# Patient Record
Sex: MALE | Race: WHITE | Employment: FULL TIME | URBAN - METROPOLITAN AREA
[De-identification: names, ages, dates, MRNs, and addresses within clinical notes are randomized per-mention and may not be internally consistent; named-entity substitution may affect disease eponyms.]

---

## 2017-05-23 ENCOUNTER — OFFICE VISIT (OUTPATIENT)
Dept: URGENT CARE | Age: 37
End: 2017-05-23

## 2017-05-23 VITALS
HEIGHT: 72 IN | HEART RATE: 68 BPM | DIASTOLIC BLOOD PRESSURE: 60 MMHG | WEIGHT: 195 LBS | SYSTOLIC BLOOD PRESSURE: 100 MMHG | RESPIRATION RATE: 16 BRPM | BODY MASS INDEX: 26.41 KG/M2 | TEMPERATURE: 97.5 F

## 2017-05-23 DIAGNOSIS — R19.7 VOMITING AND DIARRHEA: Primary | ICD-10-CM

## 2017-05-23 DIAGNOSIS — R11.10 VOMITING AND DIARRHEA: Primary | ICD-10-CM

## 2017-05-23 PROCEDURE — 99203 OFFICE O/P NEW LOW 30 MIN: CPT | Performed by: EMERGENCY MEDICINE

## 2017-05-23 RX ORDER — PROMETHAZINE HYDROCHLORIDE 25 MG/1
25 TABLET ORAL EVERY 6 HOURS PRN
Qty: 12 TABLET | Refills: 0 | Status: SHIPPED | OUTPATIENT
Start: 2017-05-23 | End: 2019-01-29 | Stop reason: ALTCHOICE

## 2017-05-23 RX ORDER — LOPERAMIDE HYDROCHLORIDE 2 MG/1
2 CAPSULE ORAL 4 TIMES DAILY PRN
Qty: 12 CAPSULE | Refills: 0 | Status: SHIPPED | OUTPATIENT
Start: 2017-05-23 | End: 2019-01-29 | Stop reason: ALTCHOICE

## 2017-05-23 ASSESSMENT — ENCOUNTER SYMPTOMS
EYES NEGATIVE: 1
ABDOMINAL PAIN: 1
NAUSEA: 1
SHORTNESS OF BREATH: 0
BLOOD IN STOOL: 0
COUGH: 0
DIARRHEA: 1
VOMITING: 1

## 2018-02-25 PROBLEM — K35.80 ACUTE APPENDICITIS: Status: ACTIVE | Noted: 2018-02-25

## 2018-03-13 ENCOUNTER — OFFICE VISIT (OUTPATIENT)
Dept: SURGERY | Age: 38
End: 2018-03-13

## 2018-03-13 VITALS
SYSTOLIC BLOOD PRESSURE: 120 MMHG | HEIGHT: 72 IN | DIASTOLIC BLOOD PRESSURE: 72 MMHG | BODY MASS INDEX: 28.85 KG/M2 | WEIGHT: 213 LBS

## 2018-03-13 DIAGNOSIS — Z98.890 POST-OPERATIVE STATE: Primary | ICD-10-CM

## 2018-03-13 PROCEDURE — 99024 POSTOP FOLLOW-UP VISIT: CPT | Performed by: SURGERY

## 2019-01-29 ENCOUNTER — APPOINTMENT (OUTPATIENT)
Dept: GENERAL RADIOLOGY | Age: 39
End: 2019-01-29
Payer: COMMERCIAL

## 2019-01-29 ENCOUNTER — HOSPITAL ENCOUNTER (EMERGENCY)
Age: 39
Discharge: HOME | End: 2019-01-29
Payer: COMMERCIAL

## 2019-01-29 VITALS
HEART RATE: 82 BPM | DIASTOLIC BLOOD PRESSURE: 93 MMHG | OXYGEN SATURATION: 96 % | TEMPERATURE: 98.4 F | SYSTOLIC BLOOD PRESSURE: 125 MMHG | RESPIRATION RATE: 16 BRPM

## 2019-01-29 DIAGNOSIS — M25.512 ACUTE PAIN OF LEFT SHOULDER: ICD-10-CM

## 2019-01-29 DIAGNOSIS — S09.90XA INJURY OF HEAD, INITIAL ENCOUNTER: Primary | ICD-10-CM
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
## 2019-01-29 PROCEDURE — 73030 X-RAY EXAM OF SHOULDER: CPT

## 2019-01-29 PROCEDURE — 72100 X-RAY EXAM L-S SPINE 2/3 VWS: CPT

## 2019-01-29 PROCEDURE — 6370000000 HC RX 637 (ALT 250 FOR IP): Performed by: NURSE PRACTITIONER

## 2019-01-29 PROCEDURE — 99283 EMERGENCY DEPT VISIT LOW MDM: CPT

## 2019-01-29 RX ORDER — NAPROXEN 250 MG/1
250 TABLET ORAL 2 TIMES DAILY WITH MEALS
Qty: 60 TABLET | Refills: 0 | Status: SHIPPED | OUTPATIENT
Start: 2019-01-29 | End: 2020-01-24

## 2019-01-29 RX ORDER — HYDROCODONE BITARTRATE AND ACETAMINOPHEN 5; 325 MG/1; MG/1
1 TABLET ORAL ONCE
Status: COMPLETED | OUTPATIENT
Start: 2019-01-29 | End: 2019-01-29

## 2019-01-29 RX ADMIN — HYDROCODONE BITARTRATE AND ACETAMINOPHEN 1 TABLET: 5; 325 TABLET ORAL at 14:01

## 2019-01-29 ASSESSMENT — PAIN SCALES - GENERAL
PAINLEVEL_OUTOF10: 8
PAINLEVEL_OUTOF10: 8
PAINLEVEL_OUTOF10: 7

## 2019-01-29 ASSESSMENT — PAIN DESCRIPTION - LOCATION
LOCATION: BACK;HEAD
LOCATION: BACK;HEAD

## 2019-01-29 ASSESSMENT — ENCOUNTER SYMPTOMS
COLOR CHANGE: 0
BACK PAIN: 1

## 2020-01-24 ENCOUNTER — HOSPITAL ENCOUNTER (EMERGENCY)
Age: 40
Discharge: HOME | End: 2020-01-24
Payer: COMMERCIAL

## 2020-01-24 VITALS
SYSTOLIC BLOOD PRESSURE: 128 MMHG | HEIGHT: 72 IN | HEART RATE: 103 BPM | TEMPERATURE: 98 F | DIASTOLIC BLOOD PRESSURE: 83 MMHG | BODY MASS INDEX: 29.12 KG/M2 | WEIGHT: 215 LBS | RESPIRATION RATE: 20 BRPM | OXYGEN SATURATION: 97 %

## 2020-01-24 LAB
RAPID INFLUENZA  B AGN: NEGATIVE
RAPID INFLUENZA A AGN: NEGATIVE
S PYO AG THROAT QL: NEGATIVE

## 2020-01-24 PROCEDURE — 87081 CULTURE SCREEN ONLY: CPT

## 2020-01-24 PROCEDURE — 87804 INFLUENZA ASSAY W/OPTIC: CPT

## 2020-01-24 PROCEDURE — 99282 EMERGENCY DEPT VISIT SF MDM: CPT

## 2020-01-24 PROCEDURE — 87880 STREP A ASSAY W/OPTIC: CPT

## 2020-01-24 PROCEDURE — 6370000000 HC RX 637 (ALT 250 FOR IP): Performed by: PHYSICIAN ASSISTANT

## 2020-01-24 RX ORDER — DOXYCYCLINE HYCLATE 100 MG
100 TABLET ORAL ONCE
Status: COMPLETED | OUTPATIENT
Start: 2020-01-24 | End: 2020-01-24

## 2020-01-24 RX ORDER — PREDNISONE 20 MG/1
20 TABLET ORAL ONCE
Status: COMPLETED | OUTPATIENT
Start: 2020-01-24 | End: 2020-01-24

## 2020-01-24 RX ORDER — DOXYCYCLINE HYCLATE 100 MG
100 TABLET ORAL 2 TIMES DAILY
Qty: 20 TABLET | Refills: 0 | Status: SHIPPED | OUTPATIENT
Start: 2020-01-24 | End: 2020-02-03

## 2020-01-24 RX ORDER — PREDNISONE 10 MG/1
20 TABLET ORAL DAILY
Qty: 10 TABLET | Refills: 0 | Status: SHIPPED | OUTPATIENT
Start: 2020-01-24 | End: 2020-01-29

## 2020-01-24 RX ORDER — LEVOCETIRIZINE DIHYDROCHLORIDE 5 MG/1
5 TABLET, FILM COATED ORAL NIGHTLY
Qty: 14 TABLET | Refills: 0 | Status: SHIPPED | OUTPATIENT
Start: 2020-01-24

## 2020-01-24 RX ORDER — SULFAMETHOXAZOLE AND TRIMETHOPRIM 800; 160 MG/1; MG/1
TABLET ORAL
COMMUNITY
Start: 2020-01-22 | End: 2020-01-24 | Stop reason: SINTOL

## 2020-01-24 RX ADMIN — PREDNISONE 20 MG: 20 TABLET ORAL at 23:37

## 2020-01-24 RX ADMIN — DOXYCYCLINE HYCLATE 100 MG: 100 TABLET, COATED ORAL at 23:37

## 2020-01-24 ASSESSMENT — PAIN SCALES - GENERAL: PAINLEVEL_OUTOF10: 8

## 2020-01-25 NOTE — ED PROVIDER NOTES
Constitutional:       Appearance: Normal appearance. He is well-developed and normal weight. HENT:      Head: Normocephalic and atraumatic. Right Ear: Tympanic membrane, ear canal and external ear normal.      Left Ear: Tympanic membrane, ear canal and external ear normal.      Nose: Congestion present. Mouth/Throat:      Mouth: Mucous membranes are moist.      Pharynx: Oropharynx is clear. Eyes:      General: No scleral icterus. Right eye: No discharge. Left eye: No discharge. Conjunctiva/sclera: Conjunctivae normal.   Neck:      Musculoskeletal: Normal range of motion and neck supple. No muscular tenderness. Cardiovascular:      Rate and Rhythm: Normal rate and regular rhythm. Heart sounds: Normal heart sounds. Pulmonary:      Effort: Pulmonary effort is normal.   Musculoskeletal: Normal range of motion. Skin:     General: Skin is warm and dry. Neurological:      General: No focal deficit present. Mental Status: He is alert and oriented to person, place, and time. Mental status is at baseline. Psychiatric:         Mood and Affect: Mood normal.         Behavior: Behavior normal.         Thought Content:  Thought content normal.         Judgment: Judgment normal.         MEDICAL DECISION MAKING    Vitals:    Vitals:    01/24/20 2232   BP: 128/83   Pulse: 103   Resp: 20   Temp: 98 °F (36.7 °C)   TempSrc: Oral   SpO2: 97%   Weight: 215 lb (97.5 kg)   Height: 6' (1.829 m)       LABS:  Labs Reviewed   RAPID INFLUENZA A/B ANTIGENS    Narrative:     Performed at:  [place] [address] [facility]   Phone [phone] A THROAT    Narrative:     Performed at:  [place] [address] [facility]   Phone [phone]   CULTURE BETA STREP CONFIRM PLATE    Narrative:     ORDER#: [id number]                          ORDERED BY: DANIEL HAY  SOURCE: Throat                             COLLECTED:  01/24/20 22:36  ANTIBIOTICS AT SHARLA.:                      RECEIVED :  01/25/20 09:53  Performed at:  [place] [address] [address] [facility]   Phone ([phone] of labs reviewed and werenegative at this time or not returned at the time of this note. RADIOLOGY:   Non-plain film images such as CT, Ultrasound and MRI are read by the radiologist. Rajat Dacosta PA-C have directly visualized the radiologic plain film image(s) with the below findings:    Not obtained    Interpretation per the Radiologist below, if available at the time of this note:    No orders to display        No results found. MEDICAL DECISION MAKING / ED COURSE:      PROCEDURES:   Procedures    None    Patient was given:  Medications   doxycycline hyclate (VIBRA-TABS) tablet 100 mg (100 mg Oral Given 1/24/20 2337)   predniSONE (DELTASONE) tablet 20 mg (20 mg Oral Given 1/24/20 2337)       I did consult with her pharmacist and they did indicate the congestion could be related to the sulfa product. Patient with nasal congestion rather moderate to marked as well as facial flushing. This will be discontinued. I will start patient on prednisone to help relieve current symptoms also recommending Afrin nasal spray via the pump unit. Crutches prednisone given. Continuation prednisone will take place over the next 4 to 5 days. The patient does express understanding of his diagnosis and treatment plan. I did opt to change patient from Bactrim to the doxycycline for the cyst as he has had exercycle in in the past without adverse event. The patient tolerated their visit well. I evaluated the patient. The physician was available for consultation as needed. The patient and / or the family were informed of the results of any tests, a time was given to answer questions, a plan was proposed and they agreed with plan. CLINICAL IMPRESSION:  1.  Adverse effect of sulfacetamide, initial encounter        DISPOSITION Decision To Discharge 01/24/2020 11:34:54 PM      PATIENT REFERRED TO:  Your healthcare provider    Schedule an appointment as soon as possible for a visit       [facility]  ED  [address]  Go to   If symptoms worsen      DISCHARGE MEDICATIONS:  Discharge Medication List as of 1/24/2020 11:39 PM      START taking these medications    Details   predniSONE (DELTASONE) 10 MG tablet Take 2 tablets by mouth daily for 5 doses, Disp-10 tablet, R-0Print      levocetirizine (XYZAL) 5 MG tablet Take 1 tablet by mouth nightly, Disp-14 tablet, R-0Print      doxycycline hyclate (VIBRA-TABS) 100 MG tablet Take 1 tablet by mouth 2 times daily for 10 days, Disp-20 tablet, R-0Print             DISCONTINUED MEDICATIONS:  Discharge Medication List as of 1/24/2020 11:39 PM      STOP taking these medications       naproxen (NAPROSYN) 250 MG tablet Comments:   Reason for Stopping:         dicyclomine (BENTYL) 10 MG capsule Comments:   Reason for Stopping:                      (Please note the MDM and HPI sections of this note were completed with a voice recognition program.  Efforts were made to edit the dictations but occasionally words are mis-transcribed.)    Electronically signed, Patrica Cintron PA-C,          Patrica Cintron PA-C  01/25/20 9376

## 2020-01-26 LAB — S PYO THROAT QL CULT: NORMAL

## 2020-09-09 ENCOUNTER — TELEPHONE (OUTPATIENT)
Dept: FAMILY MEDICINE CLINIC | Age: 40
End: 2020-09-09

## 2020-09-09 NOTE — TELEPHONE ENCOUNTER
It is my understanding that the pt has to be established with a provider before we can see them for an acute visit. You can double check with Asiya Kyle. If I am allowed to see him before he establishes with Judit, I would be happy to do so if I have an open OV. Thank you.

## 2020-09-09 NOTE — TELEPHONE ENCOUNTER
Patient is scheduled to see you for a new patient appt but not until 10/12. Hes been having a lot of dizziness/ear problems and wants to know if there is any possible way he can be seen sooner? No OTC has seemed to be helping. Could use same days or provider fill if available? No other issues.  Please Advise

## 2020-09-10 NOTE — TELEPHONE ENCOUNTER
Spoke to leny she said to speak to Dr. Troy Dupont to see if he would see patient. Spoke to him and he said that would be fine.  Patient will be seeing Dr. Troy Dupont as anew patient next week

## 2020-09-10 NOTE — TELEPHONE ENCOUNTER
Pt is scheduled as below for NP appt.   Future Appointments   Date Time Provider Helena Haile   9/14/2020  2:00 PM DO MARIELOS Canas

## 2020-09-14 ENCOUNTER — OFFICE VISIT (OUTPATIENT)
Dept: FAMILY MEDICINE CLINIC | Age: 40
End: 2020-09-14
Payer: COMMERCIAL

## 2020-09-14 VITALS
BODY MASS INDEX: 29.39 KG/M2 | RESPIRATION RATE: 16 BRPM | HEART RATE: 90 BPM | TEMPERATURE: 98.1 F | DIASTOLIC BLOOD PRESSURE: 76 MMHG | OXYGEN SATURATION: 99 % | HEIGHT: 72 IN | SYSTOLIC BLOOD PRESSURE: 140 MMHG | WEIGHT: 217 LBS

## 2020-09-14 DIAGNOSIS — Z11.4 ENCOUNTER FOR SCREENING FOR HIV: ICD-10-CM

## 2020-09-14 PROCEDURE — 90472 IMMUNIZATION ADMIN EACH ADD: CPT | Performed by: STUDENT IN AN ORGANIZED HEALTH CARE EDUCATION/TRAINING PROGRAM

## 2020-09-14 PROCEDURE — 90686 IIV4 VACC NO PRSV 0.5 ML IM: CPT | Performed by: STUDENT IN AN ORGANIZED HEALTH CARE EDUCATION/TRAINING PROGRAM

## 2020-09-14 PROCEDURE — 90471 IMMUNIZATION ADMIN: CPT | Performed by: STUDENT IN AN ORGANIZED HEALTH CARE EDUCATION/TRAINING PROGRAM

## 2020-09-14 PROCEDURE — 99214 OFFICE O/P EST MOD 30 MIN: CPT | Performed by: STUDENT IN AN ORGANIZED HEALTH CARE EDUCATION/TRAINING PROGRAM

## 2020-09-14 PROCEDURE — 90715 TDAP VACCINE 7 YRS/> IM: CPT | Performed by: STUDENT IN AN ORGANIZED HEALTH CARE EDUCATION/TRAINING PROGRAM

## 2020-09-14 RX ORDER — MECLIZINE HYDROCHLORIDE 25 MG/1
25 TABLET ORAL 3 TIMES DAILY PRN
Qty: 30 TABLET | Refills: 0 | Status: SHIPPED | OUTPATIENT
Start: 2020-09-14 | End: 2020-09-24

## 2020-09-14 ASSESSMENT — PATIENT HEALTH QUESTIONNAIRE - PHQ9
7. TROUBLE CONCENTRATING ON THINGS, SUCH AS READING THE NEWSPAPER OR WATCHING TELEVISION: 0
SUM OF ALL RESPONSES TO PHQ QUESTIONS 1-9: 10
8. MOVING OR SPEAKING SO SLOWLY THAT OTHER PEOPLE COULD HAVE NOTICED. OR THE OPPOSITE, BEING SO FIGETY OR RESTLESS THAT YOU HAVE BEEN MOVING AROUND A LOT MORE THAN USUAL: 0
6. FEELING BAD ABOUT YOURSELF - OR THAT YOU ARE A FAILURE OR HAVE LET YOURSELF OR YOUR FAMILY DOWN: 1
9. THOUGHTS THAT YOU WOULD BE BETTER OFF DEAD, OR OF HURTING YOURSELF: 0
4. FEELING TIRED OR HAVING LITTLE ENERGY: 3
SUM OF ALL RESPONSES TO PHQ9 QUESTIONS 1 & 2: 2
2. FEELING DOWN, DEPRESSED OR HOPELESS: 2
SUM OF ALL RESPONSES TO PHQ QUESTIONS 1-9: 2
3. TROUBLE FALLING OR STAYING ASLEEP: 3
SUM OF ALL RESPONSES TO PHQ QUESTIONS 1-9: 10
1. LITTLE INTEREST OR PLEASURE IN DOING THINGS: 1
2. FEELING DOWN, DEPRESSED OR HOPELESS: 1
SUM OF ALL RESPONSES TO PHQ QUESTIONS 1-9: 2
5. POOR APPETITE OR OVEREATING: 0
SUM OF ALL RESPONSES TO PHQ9 QUESTIONS 1 & 2: 3
1. LITTLE INTEREST OR PLEASURE IN DOING THINGS: 1

## 2020-09-14 ASSESSMENT — ENCOUNTER SYMPTOMS
DIARRHEA: 0
SHORTNESS OF BREATH: 0
CONSTIPATION: 0

## 2020-09-14 ASSESSMENT — ANXIETY QUESTIONNAIRES
2. NOT BEING ABLE TO STOP OR CONTROL WORRYING: 3-NEARLY EVERY DAY
GAD7 TOTAL SCORE: 17
1. FEELING NERVOUS, ANXIOUS, OR ON EDGE: 3-NEARLY EVERY DAY
6. BECOMING EASILY ANNOYED OR IRRITABLE: 3-NEARLY EVERY DAY
4. TROUBLE RELAXING: 2-OVER HALF THE DAYS
3. WORRYING TOO MUCH ABOUT DIFFERENT THINGS: 3-NEARLY EVERY DAY
7. FEELING AFRAID AS IF SOMETHING AWFUL MIGHT HAPPEN: 1-SEVERAL DAYS
5. BEING SO RESTLESS THAT IT IS HARD TO SIT STILL: 2-OVER HALF THE DAYS

## 2020-09-14 ASSESSMENT — COLUMBIA-SUICIDE SEVERITY RATING SCALE - C-SSRS
2. HAVE YOU ACTUALLY HAD ANY THOUGHTS OF KILLING YOURSELF?: NO
1. WITHIN THE PAST MONTH, HAVE YOU WISHED YOU WERE DEAD OR WISHED YOU COULD GO TO SLEEP AND NOT WAKE UP?: NO
6. HAVE YOU EVER DONE ANYTHING, STARTED TO DO ANYTHING, OR PREPARED TO DO ANYTHING TO END YOUR LIFE?: NO

## 2020-09-14 NOTE — LETTER
[address]  [facility] [address]  Phone: [phone]  Fax: [phone]    Justine Way DO        September 14, 2020     Patient: Alyse Yoon   YOB: 1980   Date of Visit: 9/14/2020       To Whom It May Concern: It is my medical opinion that Usman Adams should avoid all rapid changes in position including lifting, bending, rotating until further evaluation. If you have any questions or concerns, please don't hesitate to call.     Sincerely,          Justine Way DO

## 2020-09-14 NOTE — PATIENT INSTRUCTIONS
loss of movement in your face, arm, or leg, especially on only one side of your body. ? Sudden vision changes. ? Sudden trouble speaking. ? Sudden confusion or trouble understanding simple statements. ? Sudden problems with walking or balance. ? A sudden, severe headache that is different from past headaches. Call your doctor now or seek immediate medical care if:  · You have new or worse nausea and vomiting. · You have new symptoms such as hearing loss or roaring in your ears. Watch closely for changes in your health, and be sure to contact your doctor if:  · You are not getting better as expected. · Your vertigo gets worse. Where can you learn more? Go to [email or web address] and sign in to your [employer] account. Enter  in the [employer]apier box to learn more about \"Benign Paroxysmal Positional Vertigo (BPPV): Care Instructions. \"     If you do not have an account, please click on the \"Sign Up Now\" link. Current as of: July 29, 2019               Content Version: 12.5  © 6888-7526 [employer]. Care instructions adapted under license by [facility] ([facility]). If you have questions about a medical condition or this instruction, always ask your healthcare professional. [place] [address] any warranty or liability for your use of this information. Patient Education        Epley Maneuver for Vertigo: Exercises  Your Care Instructions  The Epley Maneuver is a series of movements your doctor may use to treat your vertigo. Here are the steps for the exercises. Your doctor or physical therapist will guide you through the movements. A single 10- to 15-minute session often is all that's needed. Crystal debris (canaliths) cause the vertigo. When your head is moved into different positions, the debris moves freely. This may cause your symptoms to stop. How to do the exercises  Step 1   1. You will sit on the doctor's exam table.  Your legs will be out

## 2020-09-14 NOTE — PROGRESS NOTES
Height: 6' (1.829 m)     Estimated body mass index is 29.43 kg/m² as calculated from the following:    Height as of this encounter: 6' (1.829 m). Weight as of this encounter: 217 lb (98.4 kg). Physical Exam  Vitals signs reviewed. Constitutional:       General: He is not in acute distress. Appearance: Normal appearance. He is not ill-appearing. HENT:      Head: Normocephalic and atraumatic. Right Ear: Tympanic membrane normal.      Left Ear: Tympanic membrane normal.   Eyes:      Extraocular Movements: Extraocular movements intact. Conjunctiva/sclera: Conjunctivae normal.   Cardiovascular:      Rate and Rhythm: Normal rate and regular rhythm. Pulses: Normal pulses. Heart sounds: Normal heart sounds. No murmur. Pulmonary:      Effort: Pulmonary effort is normal.      Breath sounds: Normal breath sounds. Abdominal:      General: Abdomen is flat. Bowel sounds are normal. There is no distension. Palpations: Abdomen is soft. Tenderness: There is no abdominal tenderness. Musculoskeletal: Normal range of motion. General: No swelling. Right lower leg: No edema. Left lower leg: No edema. Skin:     General: Skin is warm and dry. Capillary Refill: Capillary refill takes less than 2 seconds. Findings: No rash. Neurological:      General: No focal deficit present. Mental Status: He is alert and oriented to person, place, and time. Comments: +HINTS  [place] halpike without nystagmus but with onset of dizziness   Psychiatric:         Mood and Affect: Mood normal.         Behavior: Behavior normal.         Thought Content: Thought content normal.         Judgment: Judgment normal.       AL 7 SCORE 9/14/2020   AL-7 Total Score 17     Interpretation of AL-7 score: 5-9 = mild anxiety, 10-14 = moderate anxiety, 15+ = severe anxiety. Recommend referral to behavioral health for scores 10 or greater.   PHQ-9 Total Score: 10 (9/14/2020  2:22 PM)  Thoughts that you would be better off dead, or of hurting yourself in some way: 0 (9/14/2020  2:22 PM)        ASSESSMENT/PLAN:  1. Benign paroxysmal positional vertigo of right ear  Examination findings and history consistent with BPPV. Will send to vestibular rehab. If no improvement with vestibular rehab would consider further head imaging given family history. - [facility] [place]  - meclizine (ANTIVERT) 25 MG tablet; Take 1 tablet by mouth 3 times daily as needed for Dizziness  Dispense: 30 tablet; Refill: 0    2. Encounter for screening for HIV  We will screen  - HIV-1 AND HIV-2 ANTIBODIES; Future    3. Need for influenza vaccination  - INFLUENZA, QUADV, 0.5ML, 6 MO AND OLDER, IM, PF, PREFILL SYR OR SDV (FLUZONE QUADV, PF)    4. Need for Tdap vaccination  - Tdap (age 6y and older) IM ([address])    5. Anxiety  Discussed linda 7 score of 17. Will pursue counseling at this time, would consider medication in the future. Return in about 3 months (around 12/14/2020) for anxiety/BPPV. An  electronic signature was used to authenticate this note.     --Genaro Correa,  on 9/14/2020 at 4:44 PM

## 2020-09-14 NOTE — PROGRESS NOTES
Vaccine Information Sheet, \"Influenza - Inactivated\"  given to Yunior Veneags, or parent/legal guardian of  Yunior Venegas and verbalized understanding. Patient responses:    Have you ever had a reaction to a flu vaccine? no  Do you have any current illness? No  Have you ever had [place] Syndrome? No  Do you have a serious allergy to any of the follow: Neomycin, Polymyxin, Thimerosal, eggs or egg products? No    Flu vaccine given per order. Please see immunization tab. Risks and benefits explained. Current VIS given.       Immunizations Administered     Name Date Dose Route    Influenza, Quadv, IM, PF (6 mo and older Fluzone, Flulaval, Fluarix, and 3 yrs and older Afluria) 9/14/2020 0.5 mL Intramuscular    Site: Deltoid- Left    Lot: [id number]    NDC: 50040-185-60

## 2020-09-15 LAB
HIV AG/AB: NORMAL
HIV ANTIGEN: NORMAL
HIV-1 ANTIBODY: NORMAL
HIV-2 AB: NORMAL

## 2020-09-21 ENCOUNTER — HOSPITAL ENCOUNTER (OUTPATIENT)
Dept: PHYSICAL THERAPY | Age: 40
Setting detail: THERAPIES SERIES
Discharge: HOME | End: 2020-09-21
Payer: COMMERCIAL

## 2020-09-21 NOTE — PROGRESS NOTES
Physical Therapy  Patient called and cancelled appointment due to having a high fever all weekend and still this morning. Patient was informed that he needs to be fever free for 24-48 hours with no other symptoms and if symptoms worsen then he needs to contact his family MD. Appointment was rescheduled for Thursday at 1:30.

## 2020-09-24 ENCOUNTER — HOSPITAL ENCOUNTER (OUTPATIENT)
Dept: PHYSICAL THERAPY | Age: 40
Setting detail: THERAPIES SERIES
Discharge: HOME | End: 2020-09-24
Payer: COMMERCIAL

## 2020-09-24 PROCEDURE — 97161 PT EVAL LOW COMPLEX 20 MIN: CPT

## 2020-09-24 PROCEDURE — 97112 NEUROMUSCULAR REEDUCATION: CPT

## 2020-09-24 NOTE — PROGRESS NOTES
[facility]            The following patient has been evaluated for physical therapy services. In order for therapy to continue treatment, Medicare requires physician review of the treatment plan. Please review the attached evaluation and/or summary of the patient's plan of care, and verify that you agree therapy should continue by signing below and sending it back to our office. Thank you for this referral.    Physician signature_______________________ Date________________    Fax to:   [facility] [phone]               PHYSICAL THERAPY VESTIBULAR EVALUATION    Evaluation Date:  9/24/2020         Patient Name:  Argentina Blackwood       YOB: 1980       Medical Diagnosis: BPPV of right ear         ICD 10:  H81.11    Treatment Diagnosis:  Vertigo, motion sensitivity, possible resolving vestibular neuritis    Onset Date: 8/1/20     Referral Date:  9/14/20     Referring Physician:  Dr. 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 Health History reviewed with pt:    YES  Hx of HTN? NO  Hx of cardiovascular problems? NO  Hx of CVA/TIA? NO     Patient goal for therapy:  \" to not be dizzy \"      SUBJECTIVE FINDINGS        History of Present Illness:      Patient reports symptoms began:  Pt states symptoms began on 8/1/20 when on vacation in [facility], after hitting his head on the bottom of a water slide after coming down. Pt appeared fine after but felt dizzy that evening. Pt went to urgent care there, said it looked like he had ear infection L ear, put on zpack. Pt states problem was worsening and pt was referred to Dr. Praful Baltazar, who thought the ear looked good at this point, given meclizine and referred to PT.   Pt follows up with PCP in December, pt has hx of brother passing away from brain cancer. Pt experiences symptoms of vertigo? YES  Describe:     room spinning, loss of balance, dizziness and nausea    How long do these symptoms last?      seconds, minutes and up to 30 minutes    How often do spells occur?      daily    Vertigo is:     induced by motion and induced by position changes     Pt experiences disequilibrium? YES  Describe:  when vertigo is happening    Symptoms worse with:    movement of visual environment, self motion, complex visual environments and visual patterns    Associated hearing/ ear symptoms:     YES    Describe:    Tinnitus LEFT  Pressure/fullness LEFT  Pain LEFT    Any recent illness or infections? No    Any recent accidents or head trauma? Yes    Any history of migraines or headaches? Yes  Describe: pt does have hx of HAs but never been diagnosed with migraines    Current Functional Limitations:   YES  Functional complaints:  Missed some work, limited lifting      PLOF:   No functional limitations    History of Prior Therapy/Testing (e.g. MRI):  NA      Medications:    Pt h/o or currently taking any vestibular suppressants? Yes  Given meclizine, last taken yesterday, pt states it does help. Pt aware of any medications that may cause dizziness? No      History of Falls or near Falls:    Any falls to the ground? NO  Describe: NA    Does pt complain of stumble, stagger, or side step while walking? NO  Does pt complain of drift to one side while walking?        yes: LEFT    Limitations (hearing/vision loss/other):    Does pt wear glasses/contacts?      yes:  for distance    Does pt have chronic hearing loss?     no    Does pt wear hearing aids?    no    Pain:     NO  Location: NA              OBJECTIVE FINDINGS        Blood Pressure (if hx of HTN or possible orthostatic HTN):  tested  Supine:   Seated: 120/90 pulse 74  Standin/83 pulse 81    Cervical AROM: WNL  Description: NA    Vertebral Artery test in sitting position:     Negative    Oculomotor Examination:    Room Light:   Spontaneous Nystagmus:  NO   NA   Gaze Holding Nystagmus:  NO   NA   Eye Movement Range:  conjugate  Vergence:       Abnormal (diplopia and disconjugate at > 10 cm)      Smooth Pursuits:     WNL   Saccades:       WNL (2 or less)   VOR (Cancellation): WNL  VOR (Slow): WNL  Head Impulse Test/ Head Thrust Test:     Negative  DVA    tested  Static VA (Snellen chart, 10 feet, lowest line read with no missed optotypes):line 11  DVA (head in motion @ 2 Hz, Snellen chart, 10 feet, lowest line read with no missed optotypes):line 11  DVA is     WNL     Fixation Blocked/Frenzel/Infrared:   []Not tested   []Recorded     [x]Not recorded   Spontaneous Nystagmus:    []Yes     [x]No      Direction:  Gaze Holding Nystagmus:    []Yes     [x]No     []Direction fixed    []Direction changing  Horizontal head shaking nystagmus: []Yes     [x]No     Direction:   Vertical head shaking nystagmus:     []Yes     []No     Direction:  Pressure induced nystagmus:      []Yes     [x]No     []Tragal Pressure     []Valsalva maneuver     []Blowing air out through closed nostrils  Hyperventilation Induced Nystagmus:     []Yes     [x]No    Positional Testing:   tested  Right [place] Hallpike:    negative  Left [place] Hallpike:      negative   Right Roll test:      negative  Left Roll test:      negative  Head Center Test:     negative       Balance Testing:     not tested    Gait Testing:   tested  Level of Assistance needed:  Independent  Gait Deviations (firm surface/linoleum):    None  Assistive Device Used:  No AD    Stairs:  Not tested      Functional Outcome Measure:   []NA  Measure Used: Dizziness Handicap Inventory   Score: 46/100  Date Assessed: 9/24/20     ASSESSMENT  : pt is a 46 y o male with complaints of dizziness and imbalance. Pt tests negative for BPPV today in clinic.   Pt's history and subjective complaints suggest possible vestibular neuritis resulting in unilateral vestibular hypofunction, possibly L ear. However, was unable to reproduce a positive head thrust or abnormal DVA today. Oculomotor exam relatively unremarkable except vergence abnormal but this alone can not be relied upon for a central etiology. Pt should do well with vestibular rehab to decrease symptoms and return to prior level of function. Problems  Positive for vestibular decreased gaze stability   Positive for motion sensitivity  Decreased functional status   Other: symptoms and history suggest possible vestibular neuritis    Rehabilitation Potential:  Good for goals listed below. Strengths for achieving goals include:  Pt motivated, PLOF and Family Support  Limitations for achieving goals include:  none    Prognosis: Good      GOALS      Short Term Goals:  3  weeks MET NOT MET Long Term Goals:   6 weeks MET NOT MET   1). Establish HEP [] [] 1). Pt indep with HEP [] []   2). Pt able to perform VOR x 1 viewing at 90 reps or more [] [] 2). No complaints of dizziness [] []   3). DHI score 30 or less  [] [] 3). DHI score 10 or less [] []   4). Assess balance and make LTG if needed [] [] 4). Return to prior level of function [] []   5). [] [] 5). Balance LTG TBD  [] []   6). [] [] 6). [] []     PLAN OF CARE  To see patient  1-2 x/week for 6 weeks for the following treatment interventions:  Neuromuscular Re-education: Gaze Stability Ex  , Balance Ex   and Habituation Ex  Therapeutic Exercise   Gait Training   Manual Therapy  Therapeutic Activity      Treatment Performed this visit :   40 minutes    Neuro Re-ed: 40 minutes   Pt inst in role of PT, prognosis, plan of care, use of CP/HP, activity modification, and benefits of therapy.     Pt educated on possible causes of vertigo including BPPV and neuritis   Pt given handout for vestibular neuritis   Initiated week 1 of VOR exs and pt given handout       Pt response to Tx:  Pt tolerated

## 2020-09-30 ENCOUNTER — HOSPITAL ENCOUNTER (OUTPATIENT)
Dept: PHYSICAL THERAPY | Age: 40
Setting detail: THERAPIES SERIES
Discharge: HOME | End: 2020-09-30
Payer: COMMERCIAL

## 2020-09-30 NOTE — FLOWSHEET NOTE
Physical Therapy  Cancellation/No-show Note    Patient Name:  Dianne Rodriguez  :  1980   Date:  2020  Cancels to Date: 1  No-shows to Date: 0    For today's appointment patient:  [x]  Cancelled  []  Rescheduled appointment  []  No-show     Reason given by patient:  []  Patient ill  []  Conflicting appointment  []  No transportation    [x]  Conflict with work  []  No reason given  []  Other:     Comments:      Electronically signed by:  Yanira Barnett, [address] [facility], [place], [id number]

## 2020-10-07 ENCOUNTER — HOSPITAL ENCOUNTER (OUTPATIENT)
Dept: PHYSICAL THERAPY | Age: 40
Setting detail: THERAPIES SERIES
Discharge: HOME | End: 2020-10-07
Payer: COMMERCIAL

## 2020-12-14 ENCOUNTER — OFFICE VISIT (OUTPATIENT)
Dept: FAMILY MEDICINE CLINIC | Age: 40
End: 2020-12-14
Payer: COMMERCIAL

## 2020-12-14 VITALS
OXYGEN SATURATION: 98 % | SYSTOLIC BLOOD PRESSURE: 115 MMHG | TEMPERATURE: 97.2 F | DIASTOLIC BLOOD PRESSURE: 70 MMHG | BODY MASS INDEX: 29.43 KG/M2 | WEIGHT: 217 LBS | HEART RATE: 90 BPM

## 2020-12-14 PROCEDURE — 99213 OFFICE O/P EST LOW 20 MIN: CPT | Performed by: STUDENT IN AN ORGANIZED HEALTH CARE EDUCATION/TRAINING PROGRAM

## 2020-12-14 ASSESSMENT — ENCOUNTER SYMPTOMS
NAUSEA: 0
DIARRHEA: 0
VOMITING: 0
CONSTIPATION: 0
SHORTNESS OF BREATH: 0

## 2020-12-14 NOTE — PROGRESS NOTES
Behavior: Behavior normal.         Thought Content: Thought content normal.         Judgment: Judgment normal.       Vitals:    12/14/20 1320   BP: 115/70   Pulse: 90   Temp: 97.2 °F (36.2 °C)   SpO2: 98%       Assessment:  Encounter Diagnoses   Name Primary?  Benign paroxysmal positional vertigo of right ear Yes    Anxiety        Plan:  1. Benign paroxysmal positional vertigo of right ear  Greatly improved with physical therapy. Reports intermittent flareups however able to manage the symptoms on his own. Using meclizine rarely. 2. Anxiety  Reports that he has continued issues with anxiety however is able to manage this on his own without medication. Reports that mood has improved around the holidays with festivities. Has been able to decorate his home. Does not wish to start medication at this time. Return in about 6 months (around 6/14/2021) for Follow up BPPV and anxiety.

## 2022-05-11 ENCOUNTER — TELEPHONE (OUTPATIENT)
Dept: FAMILY MEDICINE CLINIC | Age: 42
End: 2022-05-11

## 2022-05-11 DIAGNOSIS — Z20.2 POSSIBLE EXPOSURE TO STD: Primary | ICD-10-CM

## 2022-05-11 NOTE — TELEPHONE ENCOUNTER
Patient just found out his partner has been cheating on him. He would like to get tested for some STD's. He said he isn't sure which testing to do since it was with a few random guys so he wants to be safe. Can you place order for lab visit or do you need to see him first? He just started a new job and can only come in Friday morning and your off he would prefer only a male provider if you need to see him.

## 2022-05-13 DIAGNOSIS — Z20.2 POSSIBLE EXPOSURE TO STD: ICD-10-CM

## 2022-05-13 LAB
REASON FOR REJECTION: NORMAL
REJECTED TEST: NORMAL
SPECIMEN TYPE: NORMAL

## 2022-05-14 LAB
C. TRACHOMATIS DNA ,URINE: NEGATIVE
HBV SURFACE AB TITR SER: <3.5 MIU/ML
HEPATITIS B CORE IGM ANTIBODY: NORMAL
HEPATITIS B SURFACE ANTIGEN INTERPRETATION: NORMAL
HEPATITIS C ANTIBODY INTERPRETATION: NORMAL
HIV AG/AB: NORMAL
HIV ANTIGEN: NORMAL
HIV-1 ANTIBODY: NORMAL
HIV-2 AB: NORMAL
N. GONORRHOEAE DNA, URINE: NEGATIVE
TOTAL SYPHILLIS IGG/IGM: NORMAL

## 2023-02-08 ENCOUNTER — APPOINTMENT (OUTPATIENT)
Dept: GENERAL RADIOLOGY | Age: 43
End: 2023-02-08
Payer: COMMERCIAL

## 2023-02-08 ENCOUNTER — HOSPITAL ENCOUNTER (OUTPATIENT)
Age: 43
Setting detail: OBSERVATION
Discharge: HOME | End: 2023-02-09
Attending: EMERGENCY MEDICINE | Admitting: INTERNAL MEDICINE
Payer: COMMERCIAL

## 2023-02-08 ENCOUNTER — APPOINTMENT (OUTPATIENT)
Dept: CT IMAGING | Age: 43
End: 2023-02-08
Payer: COMMERCIAL

## 2023-02-08 DIAGNOSIS — E04.1 THYROID NODULE: ICD-10-CM

## 2023-02-08 DIAGNOSIS — R07.9 CHEST PAIN, UNSPECIFIED TYPE: Primary | ICD-10-CM

## 2023-02-08 DIAGNOSIS — R94.31 ABNORMAL EKG: ICD-10-CM

## 2023-02-08 LAB
A/G RATIO: 1.7 (ref 1.1–2.2)
ALBUMIN SERPL-MCNC: 4.7 G/DL (ref 3.4–5)
ALP BLD-CCNC: 77 U/L (ref 40–129)
ALT SERPL-CCNC: 48 U/L (ref 10–40)
ANION GAP SERPL CALCULATED.3IONS-SCNC: 9 MMOL/L (ref 3–16)
APTT: 29.8 SEC (ref 23–34.3)
AST SERPL-CCNC: 24 U/L (ref 15–37)
BASOPHILS ABSOLUTE: 0.1 K/UL (ref 0–0.2)
BASOPHILS RELATIVE PERCENT: 0.7 %
BILIRUB SERPL-MCNC: 0.4 MG/DL (ref 0–1)
BUN BLDV-MCNC: 22 MG/DL (ref 7–20)
CALCIUM SERPL-MCNC: 9.7 MG/DL (ref 8.3–10.6)
CHLORIDE BLD-SCNC: 102 MMOL/L (ref 99–110)
CO2: 28 MMOL/L (ref 21–32)
CREAT SERPL-MCNC: 1.1 MG/DL (ref 0.9–1.3)
EOSINOPHILS ABSOLUTE: 0.1 K/UL (ref 0–0.6)
EOSINOPHILS RELATIVE PERCENT: 1 %
GFR SERPL CREATININE-BSD FRML MDRD: >60 ML/MIN/{1.73_M2}
GLUCOSE BLD-MCNC: 107 MG/DL (ref 70–99)
HCT VFR BLD CALC: 45.2 % (ref 40.5–52.5)
HEMOGLOBIN: 15.7 G/DL (ref 13.5–17.5)
LYMPHOCYTES ABSOLUTE: 2.7 K/UL (ref 1–5.1)
LYMPHOCYTES RELATIVE PERCENT: 31.8 %
MCH RBC QN AUTO: 31.6 PG (ref 26–34)
MCHC RBC AUTO-ENTMCNC: 34.8 G/DL (ref 31–36)
MCV RBC AUTO: 90.7 FL (ref 80–100)
MONOCYTES ABSOLUTE: 0.9 K/UL (ref 0–1.3)
MONOCYTES RELATIVE PERCENT: 10.9 %
NEUTROPHILS ABSOLUTE: 4.8 K/UL (ref 1.7–7.7)
NEUTROPHILS RELATIVE PERCENT: 55.6 %
PDW BLD-RTO: 12.9 % (ref 12.4–15.4)
PLATELET # BLD: 181 K/UL (ref 135–450)
PMV BLD AUTO: 9.3 FL (ref 5–10.5)
POTASSIUM SERPL-SCNC: 3.9 MMOL/L (ref 3.5–5.1)
RBC # BLD: 4.98 M/UL (ref 4.2–5.9)
SODIUM BLD-SCNC: 139 MMOL/L (ref 136–145)
TOTAL PROTEIN: 7.4 G/DL (ref 6.4–8.2)
TROPONIN: <0.01 NG/ML
WBC # BLD: 8.6 K/UL (ref 4–11)

## 2023-02-08 PROCEDURE — 71045 X-RAY EXAM CHEST 1 VIEW: CPT

## 2023-02-08 PROCEDURE — 80143 DRUG ASSAY ACETAMINOPHEN: CPT

## 2023-02-08 PROCEDURE — 80179 DRUG ASSAY SALICYLATE: CPT

## 2023-02-08 PROCEDURE — 84484 ASSAY OF TROPONIN QUANT: CPT

## 2023-02-08 PROCEDURE — 82077 ASSAY SPEC XCP UR&BREATH IA: CPT

## 2023-02-08 PROCEDURE — 80053 COMPREHEN METABOLIC PANEL: CPT

## 2023-02-08 PROCEDURE — 99285 EMERGENCY DEPT VISIT HI MDM: CPT

## 2023-02-08 PROCEDURE — 6360000004 HC RX CONTRAST MEDICATION: Performed by: EMERGENCY MEDICINE

## 2023-02-08 PROCEDURE — 93005 ELECTROCARDIOGRAM TRACING: CPT | Performed by: EMERGENCY MEDICINE

## 2023-02-08 PROCEDURE — 96374 THER/PROPH/DIAG INJ IV PUSH: CPT

## 2023-02-08 PROCEDURE — 96375 TX/PRO/DX INJ NEW DRUG ADDON: CPT

## 2023-02-08 PROCEDURE — 36415 COLL VENOUS BLD VENIPUNCTURE: CPT

## 2023-02-08 PROCEDURE — 6360000002 HC RX W HCPCS: Performed by: EMERGENCY MEDICINE

## 2023-02-08 PROCEDURE — 85730 THROMBOPLASTIN TIME PARTIAL: CPT

## 2023-02-08 PROCEDURE — 85025 COMPLETE CBC W/AUTO DIFF WBC: CPT

## 2023-02-08 PROCEDURE — 71260 CT THORAX DX C+: CPT | Performed by: EMERGENCY MEDICINE

## 2023-02-08 RX ORDER — MORPHINE SULFATE 4 MG/ML
4 INJECTION, SOLUTION INTRAMUSCULAR; INTRAVENOUS ONCE
Status: COMPLETED | OUTPATIENT
Start: 2023-02-08 | End: 2023-02-08

## 2023-02-08 RX ADMIN — MORPHINE SULFATE 4 MG: 4 INJECTION, SOLUTION INTRAMUSCULAR; INTRAVENOUS at 23:58

## 2023-02-08 RX ADMIN — IOPAMIDOL 75 ML: 755 INJECTION, SOLUTION INTRAVENOUS at 23:49

## 2023-02-08 ASSESSMENT — PAIN SCALES - GENERAL
PAINLEVEL_OUTOF10: 7
PAINLEVEL_OUTOF10: 7

## 2023-02-08 ASSESSMENT — PAIN - FUNCTIONAL ASSESSMENT: PAIN_FUNCTIONAL_ASSESSMENT: 0-10

## 2023-02-08 NOTE — LETTER
[address] [facility] [address]  Phone: [phone]    No name on file. February 9, 2023     Patient: Celestina Medina   YOB: 1980   Date of Visit: 2/8/2023       To Whom It May Concern: It is the medical opinion of MD Be that Meredith Hutchison may return to work on Tuesday February 14th, 2023. If you have any questions or concerns, please don't hesitate to call.     Sincerely,        Ana Laura Araya RN

## 2023-02-09 ENCOUNTER — TELEPHONE (OUTPATIENT)
Dept: CARDIOLOGY | Age: 43
End: 2023-02-09

## 2023-02-09 ENCOUNTER — APPOINTMENT (OUTPATIENT)
Dept: CARDIAC CATH/INVASIVE PROCEDURES | Age: 43
End: 2023-02-09
Payer: COMMERCIAL

## 2023-02-09 VITALS
DIASTOLIC BLOOD PRESSURE: 89 MMHG | HEIGHT: 77 IN | RESPIRATION RATE: 18 BRPM | SYSTOLIC BLOOD PRESSURE: 131 MMHG | BODY MASS INDEX: 27.24 KG/M2 | HEART RATE: 94 BPM | TEMPERATURE: 98.8 F | OXYGEN SATURATION: 94 % | WEIGHT: 230.7 LBS

## 2023-02-09 PROBLEM — R07.9 CHEST PAIN: Status: ACTIVE | Noted: 2023-02-09

## 2023-02-09 PROBLEM — R94.31 ABNORMAL EKG: Status: ACTIVE | Noted: 2023-02-09

## 2023-02-09 LAB
ACETAMINOPHEN LEVEL: <5 UG/ML (ref 10–30)
AMPHETAMINE SCREEN, URINE: ABNORMAL
ANTI-XA UNFRAC HEPARIN: 0.33 IU/ML (ref 0.3–0.7)
ANTI-XA UNFRAC HEPARIN: <0.1 IU/ML (ref 0.3–0.7)
BARBITURATE SCREEN URINE: ABNORMAL
BENZODIAZEPINE SCREEN, URINE: ABNORMAL
BILIRUBIN URINE: NEGATIVE
BLOOD, URINE: NEGATIVE
CANNABINOID SCREEN URINE: ABNORMAL
CLARITY: CLEAR
COCAINE METABOLITE SCREEN URINE: ABNORMAL
COLOR: YELLOW
EKG ATRIAL RATE: 64 BPM
EKG ATRIAL RATE: 72 BPM
EKG ATRIAL RATE: 82 BPM
EKG ATRIAL RATE: 83 BPM
EKG DIAGNOSIS: NORMAL
EKG P AXIS: 20 DEGREES
EKG P AXIS: 34 DEGREES
EKG P AXIS: 40 DEGREES
EKG P AXIS: 43 DEGREES
EKG P-R INTERVAL: 148 MS
EKG P-R INTERVAL: 150 MS
EKG P-R INTERVAL: 158 MS
EKG P-R INTERVAL: 162 MS
EKG Q-T INTERVAL: 364 MS
EKG Q-T INTERVAL: 380 MS
EKG Q-T INTERVAL: 382 MS
EKG Q-T INTERVAL: 396 MS
EKG QRS DURATION: 100 MS
EKG QRS DURATION: 106 MS
EKG QRS DURATION: 106 MS
EKG QRS DURATION: 108 MS
EKG QTC CALCULATION (BAZETT): 408 MS
EKG QTC CALCULATION (BAZETT): 418 MS
EKG QTC CALCULATION (BAZETT): 427 MS
EKG QTC CALCULATION (BAZETT): 443 MS
EKG R AXIS: 27 DEGREES
EKG R AXIS: 39 DEGREES
EKG R AXIS: 44 DEGREES
EKG R AXIS: 45 DEGREES
EKG T AXIS: 19 DEGREES
EKG T AXIS: 28 DEGREES
EKG T AXIS: 31 DEGREES
EKG T AXIS: 39 DEGREES
EKG VENTRICULAR RATE: 64 BPM
EKG VENTRICULAR RATE: 72 BPM
EKG VENTRICULAR RATE: 82 BPM
EKG VENTRICULAR RATE: 83 BPM
ETHANOL: NORMAL MG/DL (ref 0–0.08)
FENTANYL SCREEN, URINE: ABNORMAL
GLUCOSE URINE: NEGATIVE MG/DL
KETONES, URINE: NEGATIVE MG/DL
LEUKOCYTE ESTERASE, URINE: NEGATIVE
LV EF: 53 %
LVEF MODALITY: NORMAL
Lab: ABNORMAL
METHADONE SCREEN, URINE: ABNORMAL
MICROSCOPIC EXAMINATION: NORMAL
NITRITE, URINE: NEGATIVE
OPIATE SCREEN URINE: POSITIVE
OXYCODONE URINE: ABNORMAL
PH UA: 6.5
PH UA: 6.5 (ref 5–8)
PHENCYCLIDINE SCREEN URINE: ABNORMAL
PROTEIN UA: NEGATIVE MG/DL
SALICYLATE, SERUM: <0.3 MG/DL (ref 15–30)
SPECIFIC GRAVITY UA: <=1.005 (ref 1–1.03)
TROPONIN: <0.01 NG/ML
URINE REFLEX TO CULTURE: NORMAL
URINE TYPE: NORMAL
UROBILINOGEN, URINE: 0.2 E.U./DL

## 2023-02-09 PROCEDURE — 80307 DRUG TEST PRSMV CHEM ANLYZR: CPT

## 2023-02-09 PROCEDURE — 93005 ELECTROCARDIOGRAM TRACING: CPT | Performed by: EMERGENCY MEDICINE

## 2023-02-09 PROCEDURE — 6360000002 HC RX W HCPCS: Performed by: INTERNAL MEDICINE

## 2023-02-09 PROCEDURE — 6360000002 HC RX W HCPCS

## 2023-02-09 PROCEDURE — 96366 THER/PROPH/DIAG IV INF ADDON: CPT

## 2023-02-09 PROCEDURE — 99205 OFFICE O/P NEW HI 60 MIN: CPT | Performed by: INTERNAL MEDICINE

## 2023-02-09 PROCEDURE — 93306 TTE W/DOPPLER COMPLETE: CPT

## 2023-02-09 PROCEDURE — 93010 ELECTROCARDIOGRAM REPORT: CPT | Performed by: INTERNAL MEDICINE

## 2023-02-09 PROCEDURE — 81003 URINALYSIS AUTO W/O SCOPE: CPT

## 2023-02-09 PROCEDURE — 6370000000 HC RX 637 (ALT 250 FOR IP): Performed by: EMERGENCY MEDICINE

## 2023-02-09 PROCEDURE — C1769 GUIDE WIRE: HCPCS

## 2023-02-09 PROCEDURE — C1894 INTRO/SHEATH, NON-LASER: HCPCS

## 2023-02-09 PROCEDURE — 36415 COLL VENOUS BLD VENIPUNCTURE: CPT

## 2023-02-09 PROCEDURE — 84484 ASSAY OF TROPONIN QUANT: CPT

## 2023-02-09 PROCEDURE — 96365 THER/PROPH/DIAG IV INF INIT: CPT

## 2023-02-09 PROCEDURE — 6360000002 HC RX W HCPCS: Performed by: EMERGENCY MEDICINE

## 2023-02-09 PROCEDURE — 2500000003 HC RX 250 WO HCPCS

## 2023-02-09 PROCEDURE — 93458 L HRT ARTERY/VENTRICLE ANGIO: CPT

## 2023-02-09 PROCEDURE — 85520 HEPARIN ASSAY: CPT

## 2023-02-09 PROCEDURE — 93005 ELECTROCARDIOGRAM TRACING: CPT | Performed by: INTERNAL MEDICINE

## 2023-02-09 PROCEDURE — G0378 HOSPITAL OBSERVATION PER HR: HCPCS

## 2023-02-09 PROCEDURE — 2580000003 HC RX 258: Performed by: INTERNAL MEDICINE

## 2023-02-09 PROCEDURE — 96375 TX/PRO/DX INJ NEW DRUG ADDON: CPT

## 2023-02-09 PROCEDURE — 96376 TX/PRO/DX INJ SAME DRUG ADON: CPT

## 2023-02-09 PROCEDURE — 2709999900 HC NON-CHARGEABLE SUPPLY

## 2023-02-09 PROCEDURE — 2500000003 HC RX 250 WO HCPCS: Performed by: EMERGENCY MEDICINE

## 2023-02-09 RX ORDER — ATROPINE SULFATE 0.1 MG/ML
1 INJECTION INTRAVENOUS
Status: DISCONTINUED | OUTPATIENT
Start: 2023-02-09 | End: 2023-02-09 | Stop reason: HOSPADM

## 2023-02-09 RX ORDER — HEPARIN SODIUM 10000 [USP'U]/100ML
1400 INJECTION, SOLUTION INTRAVENOUS CONTINUOUS
Status: DISCONTINUED | OUTPATIENT
Start: 2023-02-09 | End: 2023-02-09

## 2023-02-09 RX ORDER — ACETAMINOPHEN 325 MG/1
650 TABLET ORAL EVERY 6 HOURS PRN
Status: DISCONTINUED | OUTPATIENT
Start: 2023-02-09 | End: 2023-02-09 | Stop reason: HOSPADM

## 2023-02-09 RX ORDER — FENTANYL CITRATE 50 UG/ML
INJECTION, SOLUTION INTRAMUSCULAR; INTRAVENOUS
Status: COMPLETED | OUTPATIENT
Start: 2023-02-09 | End: 2023-02-09

## 2023-02-09 RX ORDER — ACETAMINOPHEN 650 MG/1
650 SUPPOSITORY RECTAL EVERY 6 HOURS PRN
Status: DISCONTINUED | OUTPATIENT
Start: 2023-02-09 | End: 2023-02-09 | Stop reason: HOSPADM

## 2023-02-09 RX ORDER — SODIUM CHLORIDE 0.9 % (FLUSH) 0.9 %
5-40 SYRINGE (ML) INJECTION PRN
Status: DISCONTINUED | OUTPATIENT
Start: 2023-02-09 | End: 2023-02-09 | Stop reason: HOSPADM

## 2023-02-09 RX ORDER — HEPARIN SODIUM 1000 [USP'U]/ML
4000 INJECTION, SOLUTION INTRAVENOUS; SUBCUTANEOUS ONCE
Status: COMPLETED | OUTPATIENT
Start: 2023-02-09 | End: 2023-02-09

## 2023-02-09 RX ORDER — HEPARIN SODIUM 1000 [USP'U]/ML
4000 INJECTION, SOLUTION INTRAVENOUS; SUBCUTANEOUS PRN
Status: DISCONTINUED | OUTPATIENT
Start: 2023-02-09 | End: 2023-02-09

## 2023-02-09 RX ORDER — ASPIRIN 81 MG/1
324 TABLET, CHEWABLE ORAL ONCE
Status: COMPLETED | OUTPATIENT
Start: 2023-02-09 | End: 2023-02-09

## 2023-02-09 RX ORDER — MORPHINE SULFATE 2 MG/ML
2 INJECTION, SOLUTION INTRAMUSCULAR; INTRAVENOUS ONCE
Status: COMPLETED | OUTPATIENT
Start: 2023-02-09 | End: 2023-02-09

## 2023-02-09 RX ORDER — PROMETHAZINE HYDROCHLORIDE 25 MG/1
12.5 TABLET ORAL EVERY 6 HOURS PRN
Status: DISCONTINUED | OUTPATIENT
Start: 2023-02-09 | End: 2023-02-09 | Stop reason: HOSPADM

## 2023-02-09 RX ORDER — SODIUM CHLORIDE 9 MG/ML
1000 INJECTION, SOLUTION INTRAVENOUS CONTINUOUS
Status: ACTIVE | OUTPATIENT
Start: 2023-02-09 | End: 2023-02-09

## 2023-02-09 RX ORDER — ONDANSETRON 2 MG/ML
4 INJECTION INTRAMUSCULAR; INTRAVENOUS EVERY 6 HOURS PRN
Status: DISCONTINUED | OUTPATIENT
Start: 2023-02-09 | End: 2023-02-09 | Stop reason: HOSPADM

## 2023-02-09 RX ORDER — SODIUM CHLORIDE 9 MG/ML
INJECTION, SOLUTION INTRAVENOUS PRN
Status: DISCONTINUED | OUTPATIENT
Start: 2023-02-09 | End: 2023-02-09 | Stop reason: HOSPADM

## 2023-02-09 RX ORDER — ACETAMINOPHEN 325 MG/1
650 TABLET ORAL EVERY 4 HOURS PRN
Status: DISCONTINUED | OUTPATIENT
Start: 2023-02-09 | End: 2023-02-09 | Stop reason: SDUPTHER

## 2023-02-09 RX ORDER — MIDAZOLAM HYDROCHLORIDE 1 MG/ML
INJECTION INTRAMUSCULAR; INTRAVENOUS
Status: COMPLETED | OUTPATIENT
Start: 2023-02-09 | End: 2023-02-09

## 2023-02-09 RX ORDER — HEPARIN SODIUM 1000 [USP'U]/ML
2000 INJECTION, SOLUTION INTRAVENOUS; SUBCUTANEOUS PRN
Status: DISCONTINUED | OUTPATIENT
Start: 2023-02-09 | End: 2023-02-09

## 2023-02-09 RX ORDER — MAGNESIUM SULFATE IN WATER 40 MG/ML
2000 INJECTION, SOLUTION INTRAVENOUS PRN
Status: DISCONTINUED | OUTPATIENT
Start: 2023-02-09 | End: 2023-02-09 | Stop reason: HOSPADM

## 2023-02-09 RX ORDER — SODIUM CHLORIDE 0.9 % (FLUSH) 0.9 %
5-40 SYRINGE (ML) INJECTION EVERY 12 HOURS SCHEDULED
Status: DISCONTINUED | OUTPATIENT
Start: 2023-02-09 | End: 2023-02-09 | Stop reason: HOSPADM

## 2023-02-09 RX ORDER — SODIUM CHLORIDE 0.9 % (FLUSH) 0.9 %
10 SYRINGE (ML) INJECTION EVERY 12 HOURS SCHEDULED
Status: DISCONTINUED | OUTPATIENT
Start: 2023-02-09 | End: 2023-02-09 | Stop reason: HOSPADM

## 2023-02-09 RX ORDER — SODIUM CHLORIDE 0.9 % (FLUSH) 0.9 %
10 SYRINGE (ML) INJECTION PRN
Status: DISCONTINUED | OUTPATIENT
Start: 2023-02-09 | End: 2023-02-09 | Stop reason: HOSPADM

## 2023-02-09 RX ORDER — POTASSIUM CHLORIDE 7.45 MG/ML
10 INJECTION INTRAVENOUS PRN
Status: DISCONTINUED | OUTPATIENT
Start: 2023-02-09 | End: 2023-02-09 | Stop reason: HOSPADM

## 2023-02-09 RX ORDER — HEPARIN SODIUM 1000 [USP'U]/ML
INJECTION, SOLUTION INTRAVENOUS; SUBCUTANEOUS
Status: COMPLETED | OUTPATIENT
Start: 2023-02-09 | End: 2023-02-09

## 2023-02-09 RX ADMIN — FENTANYL CITRATE 25 MCG: 50 INJECTION, SOLUTION INTRAMUSCULAR; INTRAVENOUS at 12:55

## 2023-02-09 RX ADMIN — SODIUM CHLORIDE 1000 ML: 9 INJECTION, SOLUTION INTRAVENOUS at 00:45

## 2023-02-09 RX ADMIN — HEPARIN SODIUM 5000 UNITS: 1000 INJECTION, SOLUTION INTRAVENOUS; SUBCUTANEOUS at 12:55

## 2023-02-09 RX ADMIN — ASPIRIN 81 MG 324 MG: 81 TABLET ORAL at 00:39

## 2023-02-09 RX ADMIN — FENTANYL CITRATE 50 MCG: 50 INJECTION, SOLUTION INTRAMUSCULAR; INTRAVENOUS at 12:50

## 2023-02-09 RX ADMIN — MIDAZOLAM HYDROCHLORIDE 1 MG: 1 INJECTION INTRAMUSCULAR; INTRAVENOUS at 12:55

## 2023-02-09 RX ADMIN — HEPARIN SODIUM 4000 UNITS: 1000 INJECTION INTRAVENOUS; SUBCUTANEOUS at 00:44

## 2023-02-09 RX ADMIN — MORPHINE SULFATE 2 MG: 2 INJECTION, SOLUTION INTRAMUSCULAR; INTRAVENOUS at 05:40

## 2023-02-09 RX ADMIN — HEPARIN SODIUM 4000 UNITS: 1000 INJECTION INTRAVENOUS; SUBCUTANEOUS at 08:45

## 2023-02-09 RX ADMIN — MIDAZOLAM HYDROCHLORIDE 2 MG: 1 INJECTION INTRAMUSCULAR; INTRAVENOUS at 12:49

## 2023-02-09 RX ADMIN — NITROGLYCERIN 1 INCH: 20 OINTMENT TOPICAL at 00:39

## 2023-02-09 RX ADMIN — HEPARIN SODIUM 1000 UNITS/HR: 10000 INJECTION, SOLUTION INTRAVENOUS at 00:45

## 2023-02-09 ASSESSMENT — PAIN DESCRIPTION - ORIENTATION
ORIENTATION: LEFT

## 2023-02-09 ASSESSMENT — PAIN SCALES - GENERAL
PAINLEVEL_OUTOF10: 4
PAINLEVEL_OUTOF10: 5
PAINLEVEL_OUTOF10: 3
PAINLEVEL_OUTOF10: 4
PAINLEVEL_OUTOF10: 5

## 2023-02-09 ASSESSMENT — PAIN DESCRIPTION - LOCATION
LOCATION: CHEST

## 2023-02-09 ASSESSMENT — PAIN DESCRIPTION - DESCRIPTORS: DESCRIPTORS: SHARP

## 2023-02-09 ASSESSMENT — LIFESTYLE VARIABLES
HOW OFTEN DO YOU HAVE A DRINK CONTAINING ALCOHOL: MONTHLY OR LESS
HOW MANY STANDARD DRINKS CONTAINING ALCOHOL DO YOU HAVE ON A TYPICAL DAY: 1 OR 2

## 2023-02-09 NOTE — TELEPHONE ENCOUNTER
Monitor company [employer]  Length of monitor 30 days  Monitor ordered by Dr. Jayde Zelaya, hospitalist   Patch ID [id number]  Device number [id number]  Monitor given to Marco A Schrader RN. Nurse to apply at the time of discharge.

## 2023-02-09 NOTE — PRE SEDATION
Sedation Pre-Procedure Note    Patient Name: Toro Crawford   YOB: 1980  Room/Bed: [facility] Rm/NONE  Medical Record Number: [id number]  Date: 2/9/2023   Time: 1:07 PM       Indication: Chest pain    Consent: I have discussed with the patient and/or the patient representative the indication, alternatives, and the possible risks and/or complications of the planned procedure and the anesthesia methods. The patient and/or patient representative appear to understand and agree to proceed. Vital Signs:   Vitals:    02/09/23 1148   BP: 123/89   Pulse: (!) 101   Resp: 18   Temp: 97.7 °F (36.5 °C)   SpO2: 94%       Past Medical History:   has no past medical history on file. Past Surgical History:   has a past surgical history that includes Cholecystectomy and Appendectomy. Medications:   Scheduled Meds:    sodium chloride flush  10 mL IntraVENous 2 times per day     Continuous Infusions:    sodium chloride       PRN Meds: sodium chloride flush, sodium chloride, potassium chloride, magnesium sulfate, promethazine **OR** ondansetron, acetaminophen **OR** acetaminophen, atropine, perflutren lipid microspheres  Home Meds:   Prior to Admission medications    Not on File     Coumadin Use Last 7 Days:  no  Antiplatelet drug therapy use last 7 days: no  Other anticoagulant use last 7 days: no  Additional Medication Information:  no      Pre-Sedation Documentation and Exam:   I have personally completed a history, physical exam & review of systems for this patient (see notes). Vital signs have been reviewed (see flow sheet for vitals). I have reviewed the patient's history and review of systems.     Mallampati Airway Assessment:  Mallampati Class II - (soft palate, fauces & uvula are visible)    Prior History of Anesthesia Complications:   none    ASA Classification:  Class 2 - A normal healthy patient with mild systemic disease    Sedation/ Anesthesia Plan:   intravenous sedation    Medications Planned: midazolam (Versed) intravenously and fentanyl intravenously    Patient is an appropriate candidate for plan of sedation: yes    Electronically signed by Akua Antony MD on 2/9/2023 at 1:07 PM

## 2023-02-09 NOTE — PLAN OF CARE
Problem: Pain  Goal: Verbalizes/displays adequate comfort level or baseline comfort level  2/9/2023 1116 by Laura Ferrari RN  Outcome: Progressing

## 2023-02-09 NOTE — PLAN OF CARE
Problem: Discharge Planning  Goal: Discharge to home or other facility with appropriate resources  Outcome: Progressing   Pt lives at home with significant other. Not ready for discharge at this point. Problem: Pain  Goal: Verbalizes/displays adequate comfort level or baseline comfort level  Outcome: Progressing   Pt will be satisfied with pain control. Pt uses numeric pain rating scale with reassessments after pain med administration. Will continue to monitor progression throughout shift.     Problem: ABCDS Injury Assessment  Goal: Absence of physical injury  Outcome: Progressing

## 2023-02-09 NOTE — DISCHARGE INSTRUCTIONS
FOLLOW-UP APPOINTMENTS    DESIRE OFFICE - Appointment on April 24th at [address] [facility] with Juan Luis Machado MD, electrophysiologist, [address] [facility],  [facility], [address] [facility] [address], [address] [facility], [address] [facility]. Office #: [phone]. If you are unable to make this appointment, please call to reschedule. Directions to [facility]  [address] [place]. [address] [facility] exit. Right off exit. Cross over [employer]. Right on [address]. Left into hospital. Follow the signs to the emergency room ( turn left toward the Emergency room). Go right at the first stop sign. Just past the Emergency room at the second stop sign turn right and go up the ramp and park on the top level if possible. Go in the glass doors of the [facility] on the top level of the garage [address]. As soon as you get in the door turn left and our office is the one with the glass doors. [employer] MONITOR PATCH PATIENT INSTRUCTIONS    Remove Patch in 1 week from application. Recalibrate and apply next patch. Call  [employer] CUSTOMER SUPPORT: [phone] and they will assist if needed. o PIN: [id number]    o Keep the phone with you as much as possible. Be sure to charge the phone overnight and when the battery gets low. If the phone dies completely, be sure to restart the phone and enter the PIN number to confirm the patch is connected to your phone.    o If you are away from the phone for more than 8 hours, please stay within a 30 foot range of the phone for 3 hours to ensure your data fully uploads. o If you go somewhere with no cellphone service, still keep the phone with you and charged. Your data will upload when you reach cellphone service. You can stay outside of cellphone range for up to 10 days. o This phone cannot make calls, take pictures, etc. It is solely for medical use and will only Bluetooth connect to your patch.     · Patch Instructions:    o Wait to exercise or shower for 30 minutes after application. o Shower with the water stream to your back and avoid putting the patch directly under the water stream.    o Do not bathe, swim or fully submerge the patch.    o If the patch starts to lift off after showering or sweating, dry the patch with a towel and allow the adhesive to dry. It should re-adhere to your skin. If it continues to lift, use the adhesive overlay to re-adhere the patch. Video instructions for the adhesive overlay are on the phone under the Menu Tab in the right hand corner - [address]    o One patch lasts for up to 7 days. If you need to wear an additional patch or replace a patch, utilize the included application instructions or the video instructions - Menu - Help - [employer] Application    · End of Wear - Returning the Phone    o Criselda Noonan are responsible for returning the phone. You will be financially responsible for an unreturned phone. o Discard the used patch in the trash    o Return the phone,  and any unopened additional patches or adhesives in the pre-paid mailing pouch or drop off at the office. o Drop the  at [address] [place] [address] or box or schedule a home  by calling    ups - [phone]. Keep the tracking number for your records.     CONTACT [employer] CUSTOMER SUPPORT:  [phone]

## 2023-02-09 NOTE — H&P
Hospital Medicine History & Physical      PCP: Viet Whalen DO    Date of Admission: 2/8/2023    Date of Service: Pt seen/examined on 02/09/2023    Pt seen/examined face to face on and admitted as inpatient with expected LOS to be two days but can change depending on diagnostic work up and treatment response. Chief Complaint:    Chief Complaint   Patient presents with    Chest Pain     CP started with left arm and neck last night, intense pain started today while sleeping with SOB. History Of Present Illness:      40 y.o. male who presented to [facility] with past medical history appendectomy, cholecystectomy presented to ED with chief complaint of chest pain    Patient reports that he is healthy and functional independent positive. IBS diagnosed years ago otherwise patient denied history of hypertension, hyperlipidemia reports that he has no family history of sudden cardiac death in the family. Patient reports that he has been having some chest pressure yesterday night progressively worsening and due to being intermittent and now with associated diaphoresis patient came for further evaluation described as mid to left side chest.  Patient baseline works at [employer] and is able to climb 7 flights of stairs patient otherwise denies smoking drinking and drugs    Past Medical History:      IBS    Past Surgical History:          Procedure Laterality Date    APPENDECTOMY      CHOLECYSTECTOMY         Medications Prior to Admission:      Prior to Admission medications    Medication Sig Start Date End Date Taking? Authorizing Provider   levocetirizine (XYZAL) 5 MG tablet Take 1 tablet by mouth nightly 1/24/20   Jonh Garza PA-C       Allergies:  Amoxicillin and Sulfa antibiotics    Social History:          TOBACCO:   reports that he has never smoked. He has never used smokeless tobacco.  ETOH:   reports current alcohol use.   E-cigarette/Vaping       Questions Responses    E-cigarette/Vaping Use Never User    Start Date     Passive Exposure     Quit Date     Counseling Given     Comments               Family History:      Family History reviewed with patient, and does not pertain and non-contributory to the current illness        Problem Relation Age of Onset    Diabetes Mother     Mult Sclerosis Mother     Lupus Mother     Brain Cancer Brother     Cancer Maternal Grandmother         leukemia       REVIEW OF SYSTEMS:     Constitutional:  No Fever, No Chills, No Night Sweats  ENT/Mouth:  No Nasal Congestion,  No Hoarseness, No new mouth lesion  Eyes:  No Eye Pain, No Redness, No Discharge  Cardiovascular: + Chest Pain, No Orthopnea, No Palpitations  Respiratory:  No Cough, No Sputum, No Dyspnea  Gastrointestinal: No Vomiting, No Diarrhea, No abdominal pain  Genitourinary: No Urinary Frequency, No Hematuria, No Urinary pain  Musculoskeletal:  No worsening Arthralgias, No worsening Myalgias  Skin:  No new Skin Lesions, No new skin rash  Neuro:  No new weakness, No new numbness. Psych:  No suicial ideation, No Violence ideation    PHYSICAL EXAM PERFORMED:    BP (!) 87/58   Pulse 63   Temp 98.2 °F (36.8 °C) (Oral)   Resp 27   Ht 6' 5\" (1.956 m)   Wt 220 lb (99.8 kg)   SpO2 93%   BMI 26.09 kg/m²     General appearance:  mild acute distress, appears older than stated age  HEENT:   atraumatic, sclera anicteric, Conjunctivae clear. Neck: Supple,Trachea midline, no goiter  Respiratory:minimal accessory muscle usage, Normal respiratory effort. Clear to auscultation, bilaterally without wheezing  Cardiovascular:  Regular rate and rhythm, capillary refill 2 seconds  Abdomen: Soft, non-tender, non-distended with normal bowel sounds. Musculoskeletal:  No clubbing, cyanosis. trace edema LE bilaterally. Skin: turgor normal.  No new rashes or lesions. Neurologic: Alert and oriented x4, no new focal sensory/motor deficits.      Labs:     Recent Labs     02/08/23  2333   WBC 8.6   HGB 15.7   HCT 45.2    Recent Labs     02/08/23  2333      K 3.9      CO2 28   BUN 22*   CREATININE 1.1   CALCIUM 9.7     Recent Labs     02/08/23  2333   AST 24   ALT 48*   BILITOT 0.4   ALKPHOS 77     No results for input(s): INR in the last 72 hours. Recent Labs     02/08/23  2333   TROPONINI <0.01       Urinalysis:      Lab Results   Component Value Date/Time    NITRU Negative 02/25/2018 05:56 AM    WBCUA 0-2 02/25/2018 05:56 AM    BACTERIA Rare 02/25/2018 05:56 AM    RBCUA 3-5 02/25/2018 05:56 AM    BLOODU TRACE-INTACT 02/25/2018 05:56 AM    SPECGRAV 1.020 02/25/2018 05:56 AM    GLUCOSEU Negative 02/25/2018 05:56 AM       Radiology:     CXR: I have reviewed the CXR with the following interpretation:   No acute process  EKG:  I have reviewed the EKG with the following interpretation:   NSR, ST elevation in lead V2  CT CHEST PULMONARY EMBOLISM W CONTRAST   Preliminary Result   1. No CT evidence of a pulmonary embolism. 2. Small left pleural effusion. 3. Mild curvilinear and dependent opacity within both lower lobes likely   reflects atelectasis, less likely aspiration or pneumonia. 4. 17 mm partially calcified nodule within the right thyroid lobe, recommend   further characterization with a follow-up thyroid ultrasound. See below.       RECOMMENDATIONS:   Managing Incidental Thyroid Nodule Detected at CT or MRI or US      Further evaluation by thyroid Ultrasound recommended for these incidental   nodules:      Patient Age 25 years or less - Nodule of any size      Patient Age 21-27 years old - Nodule 1 cm in size or greater      PATIENT AGE 28 YEARS OR MORE - NODULE 1.5 CM IN SIZE OR GREATER      Note: These recommendations do not apply to pts. w/ increased risk      for thyroid cancer or pts. with symptomatic thyroid disease.      ________________________________________________________________      Recommendations for f/u of Incidental Thyroid Nodules (ITN)      found on CT, MR, NM and Extrathyroidal US are based upon the ACR      white paper and [facility] 3-tiered system for managing ITNs:      J Am Valentín Radiol. 2015 Feb;12(2): 143-50         XR CHEST PORTABLE   Preliminary Result   No acute cardiopulmonary disease. ASSESSMENT AND PLAN:    Chest pain:  Troponin negative x1  Cardiology consulted with a code STEMI due to ST elevation in lead V2. Cardiology concerned about  Patient admitted trend troponin  Echo pending  Cardiology consulted, appreciated    1.7 cm thyroid nodule,  TSH pending  Will need ultrasound thyroid outpatient follow-up    Suspected VALENTIN:  Last creatinine 0.7 currently at 1.1  BUN elevated with lactic acidosis  IVF and recheck    Elevated ALT: Recheck in a.m. Face-to-face discussion with the primary ER physician in regards to symptoms, history, physical exam, diagnosis and treatment, collaborative decision was to admit the patient.     Diet: NPO except meds ordered    DVT Prophylaxis: heparin drip    Dispo:   Expected LOS of two days         Carrillo Mario DO

## 2023-02-09 NOTE — PROCEDURES
CARDIAC CATHETERIZATION REPORT     Procedure Date:  2023  Patient Name: Bekah Rosa  MRN: [id number] : 1980      : Ligia Rand MD    PROCEDURES PERFORMED  Left heart cath via right radial approach  Coronary angiography  Left ventriculogram  Moderate (Conscious) Sedation 15 min CPT 58163      INDICATION  Typical chest pain concerning for unstable angina      PROCEDURE DESCRIPTION  Risks/benefits/alternatives/outcomes were discussed with patient and/or family in detail and informed consent was obtained. Patient was prepped and draped in the usual sterile fashion. Versed and fentanyl were used for conscious sedation. Then local anaesthetic was applied over right radial puncture site. Using a modified Seldinger technique, the right radial artery was selectively cannulated and a 6Fr Terumo sheath was inserted into right radial artery. Verapamil and nitroglycerin were administered through the sheath. Heparin was administered. Diagnostic 6Fr JL3.5 and JR4 were used to engage left and right coronary arteries respectively and obtain angiogram. LVEDP was obtained by using the JR4 diagnostic catheter. At the conclusion of the procedure, a TR band was placed over the puncture site and hemostasis was obtained. There were no immediate complications. I supervised the sedation with fentanyl and midazolam. An independent trained observer pushed meds at my direction. We monitored the patient's level of consciousness and vital signs/physiologic status throughout the procedure duration. At the end of the procedure, the radial sheath was removed and a TR band was placed over the arteriotomy site. Patient tolerated the procedure well.       FINDINGS  Left main - Normal  LAD - Normal  Left circumflex - Normal  RCA - Normal    LVEDP: 15      SEDATION: Moderate conscious sedation was administered by qualified nursing personnel per my orders and under my direct supervision with continuous hemodynamic monitoring    COMPLICATIONS: None    BLOOD LOSS: 20 cc        FINAL DIAGNOSIS  Normal coronaries      PLAN/RECOMMENDATIONS  - Risk factor control  - Work-up noncardiac causes of chest pain  - Okay to discharge home from cardiology's standpoint  -Will arrange outpatient EP follow-up to assess need for further work-up of intermittent Brugada pattern versus Brugada syndrome

## 2023-02-09 NOTE — PROGRESS NOTES
Pt ready to discharge. Reviewed AVS with patient and his partner, answered all questions. Return to work note given to patient. Tele box removed and CMU notified, peripheral IVs removed and gauze applied. Event monitor placed on patient and pt educated on use and how to return. Patient taken to personal car via wheelchair with all belongings.

## 2023-02-09 NOTE — ED PROVIDER NOTES
[address] [facility]  ED  EMERGENCY DEPARTMENT ENCOUNTER        Pt Name: Stormy Lewis  MRN: [id number]  [place] 1980  Date of evaluation: 2/8/2023  Provider: Martha Melissa MD  PCP: Viraj Phan DO  Note Started: 1:39 AM EST 2/9/23    CHIEF COMPLAINT       Chief Complaint   Patient presents with    Chest Pain     CP started with left arm and neck last night, intense pain started today while sleeping with SOB. HISTORY OF PRESENT ILLNESS: 1 or more Elements     History from : Patient and Significant Other          Stormy Lewis is a 40 y.o. male who presents with acute onset of chest pain. According to the patient's significant other the patient was asleep and the significant other noticed that he was breathing strangely almost as if he was having problems breathing. He awoke the patient and the patient woke up he said he was feeling lightheaded. He felt nauseated and diaphoretic. He had chest pain that he has never had before located to the mid to left-sided chest.  Radiates into the left arm. He is not a smoker. No history of cardiac disease. He is not hypertensive hyperlipidemic or diabetic. No family history of sudden death or early cardiac disease. Nursing Notes were all reviewed and agreed with or any disagreements were addressed in the HPI. REVIEW OF SYSTEMS :      Review of Systems    Positives and Pertinent negatives as per HPI.      SURGICAL HISTORY     Past Surgical History:   Procedure Laterality Date    APPENDECTOMY      CHOLECYSTECTOMY         CURRENTMEDICATIONS       Previous Medications    LEVOCETIRIZINE (XYZAL) 5 MG TABLET    Take 1 tablet by mouth nightly       ALLERGIES     Amoxicillin and Sulfa antibiotics    FAMILYHISTORY       Family History   Problem Relation Age of Onset    Diabetes Mother     Mult Sclerosis Mother     Lupus Mother     Brain Cancer Brother     Cancer Maternal Grandmother         leukemia        SOCIAL HISTORY       Social History Tobacco Use    Smoking status: Never    Smokeless tobacco: Never   Vaping Use    Vaping Use: Never used   Substance Use Topics    Alcohol use: Yes     Comment: socially    Drug use: No       SCREENINGS                         CIWA Assessment  BP: 117/84  Heart Rate: 81           PHYSICAL EXAM  1 or more Elements     ED Triage Vitals   BP Temp Temp Source Heart Rate Resp SpO2 Height Weight   02/08/23 2333 02/08/23 2353 02/08/23 2353 02/08/23 2333 02/08/23 2333 02/08/23 2338 02/08/23 2353 02/08/23 2353   (!) 138/91 98.2 °F (36.8 °C) Oral 88 19 98 % 6' 5\" (1.956 m) 220 lb (99.8 kg)       Physical Exam  Vitals and nursing note reviewed. Constitutional:       General: He is in acute distress. Appearance: Normal appearance. He is well-developed. He is not ill-appearing. HENT:      Head: Normocephalic and atraumatic. Right Ear: External ear normal.      Left Ear: External ear normal.      Nose: Nose normal.   Eyes:      General: No scleral icterus. Right eye: No discharge. Left eye: No discharge. Conjunctiva/sclera: Conjunctivae normal.   Cardiovascular:      Rate and Rhythm: Normal rate and regular rhythm. Pulses: Normal pulses. Heart sounds: Normal heart sounds. Pulmonary:      Effort: Pulmonary effort is normal. No respiratory distress. Breath sounds: Normal breath sounds. No wheezing or rales. Abdominal:      General: Bowel sounds are normal. There is no distension. Palpations: Abdomen is soft. Tenderness: There is no abdominal tenderness. There is no guarding or rebound. Musculoskeletal:         General: No swelling, tenderness, deformity or signs of injury. Cervical back: Neck supple. Skin:     Coloration: Skin is not pale. Neurological:      Mental Status: He is alert.    Psychiatric:         Mood and Affect: Mood normal.         Behavior: Behavior normal.           DIAGNOSTIC RESULTS   LABS:    Labs Reviewed   COMPREHENSIVE METABOLIC PANEL - Abnormal; Notable for the following components:       Result Value    Glucose 107 (*)     BUN 22 (*)     ALT 48 (*)     All other components within normal limits   ACETAMINOPHEN LEVEL - Abnormal; Notable for the following components:    Acetaminophen Level <5 (*)     All other components within normal limits   SALICYLATE LEVEL - Abnormal; Notable for the following components:    Salicylate, Serum <6.6 (*)     All other components within normal limits   CBC WITH AUTO DIFFERENTIAL   TROPONIN   APTT   ETHANOL   ANTI-XA, UNFRACTIONATED HEPARIN   URINALYSIS WITH REFLEX TO CULTURE   URINE DRUG SCREEN   TROPONIN   TSH WITH REFLEX TO FT4   COMPREHENSIVE METABOLIC PANEL W/ REFLEX TO MG FOR LOW K   CBC WITH AUTO DIFFERENTIAL   HEMOGLOBIN A1C       When ordered only abnormal lab results are displayed. All other labs were within normal range or not returned as of this dictation. EK-lead EKG as read and interpreted myself showed normal sinus rhythm at a rate of 82 bpm, LA interval QRS consistent. Patient has ST segment elevation with a notched morphology in lead V2. Sloping ST segment rigor elevation in V1. No reciprocal changes. No other ST segment elevation. No prior for comparison. Repeat EKG approximately 6 minutes later also read and interpreted by myself showed normal sinus rhythm at a rate of 89 bpm, appearance well and QTc normal.  Slightly prolonged QRS. Similar findings with the ST changes in V1 and V2. Twelve-lead EKG again repeated approximately half an hour later which shows normal sinus rhythm at a rate of 64 bpm, LA interval and QTc normal.  QRS slightly prolonged at 106 ms, redemonstration of ST elevation in V2 due to the morphology of V2 and V1 ST-T segments have changed.     RADIOLOGY:   Non-plain film images such as CT, Ultrasound and MRI are read by the radiologist. Plain radiographic images are visualized and preliminarily interpreted by the ED Provider with the below findings:      Interpretation per the Radiologist below, if available at the time of this note:    CT CHEST PULMONARY EMBOLISM W CONTRAST   Preliminary Result   1. No CT evidence of a pulmonary embolism. 2. Small left pleural effusion. 3. Mild curvilinear and dependent opacity within both lower lobes likely   reflects atelectasis, less likely aspiration or pneumonia. 4. 17 mm partially calcified nodule within the right thyroid lobe, recommend   further characterization with a follow-up thyroid ultrasound. See below. RECOMMENDATIONS:   Managing Incidental Thyroid Nodule Detected at CT or MRI or US      Further evaluation by thyroid Ultrasound recommended for these incidental   nodules:      Patient Age 25 years or less - Nodule of any size      Patient Age 21-27 years old - Nodule 1 cm in size or greater      PATIENT AGE 28 YEARS OR MORE - NODULE 1.5 CM IN SIZE OR GREATER      Note: These recommendations do not apply to pts. w/ increased risk      for thyroid cancer or pts. with symptomatic thyroid disease.      ________________________________________________________________      Recommendations for f/u of Incidental Thyroid Nodules (ITN)      found on CT, MR, NM and Extrathyroidal US are based upon the ACR      white paper and [facility] 3-tiered system for managing ITNs:      J Am Valentín Radiol. 2015 Feb;12(2): 143-50         XR CHEST PORTABLE   Preliminary Result   No acute cardiopulmonary disease. XR CHEST PORTABLE    Result Date: 2/8/2023  No acute cardiopulmonary disease. CT CHEST PULMONARY EMBOLISM W CONTRAST    Result Date: 2/9/2023  1. No CT evidence of a pulmonary embolism. 2. Small left pleural effusion. 3. Mild curvilinear and dependent opacity within both lower lobes likely reflects atelectasis, less likely aspiration or pneumonia. 4. 17 mm partially calcified nodule within the right thyroid lobe, recommend further characterization with a follow-up thyroid ultrasound. See below. RECOMMENDATIONS: Managing Incidental Thyroid Nodule Detected at CT or MRI or US Further evaluation by thyroid Ultrasound recommended for these incidental nodules: Patient Age 25 years or less - Nodule of any size Patient Age 21-27 years old - Nodule 1 cm in size or greater PATIENT AGE 28 YEARS OR MORE - NODULE 1.5 CM IN SIZE OR GREATER Note: These recommendations do not apply to pts. w/ increased risk for thyroid cancer or pts. with symptomatic thyroid disease. ________________________________________________________________ Recommendations for f/u of Incidental Thyroid Nodules (ITN) found on CT, MR, NM and Extrathyroidal US are based upon the ACR white paper and [facility] 3-tiered system for managing ITNs: J Am Valentín Radiol. 2015 Feb;12(2): 143-50       No results found. PROCEDURES   Unless otherwise noted below, none     Procedures    CRITICAL CARE TIME   Total Critical Care time was 50 minutes, excluding separately reportable procedures. There was a high probability of clinically significant/life threatening deterioration in the patient's condition which required my urgent intervention. PAST MEDICAL HISTORY      has no past medical history on file.      EMERGENCY DEPARTMENT COURSE and DIFFERENTIAL DIAGNOSIS/MDM:   Vitals:    Vitals:    02/09/23 0138 02/09/23 0143 02/09/23 0148 02/09/23 0153   BP: 114/78 116/78 117/80 117/84   Pulse: 79 74 78 81   Resp: 22 23 22 23   Temp:       TempSrc:       SpO2: 95% 94% 93% 94%   Weight:       Height:           Patient was given the following medications:  Medications   heparin (porcine) injection 4,000 Units (has no administration in time range)   heparin (porcine) injection 2,000 Units (has no administration in time range)   heparin 25,000 unit in sodium chloride 0.45% 250 mL (premix) infusion (1,000 Units/hr IntraVENous New Bag 2/9/23 0045)   sodium chloride flush 0.9 % injection 10 mL (has no administration in time range)   sodium chloride flush 0.9 % injection 10 mL (has no administration in time range)   0.9 % sodium chloride infusion (has no administration in time range)   potassium chloride 10 mEq/100 mL IVPB (Peripheral Line) (has no administration in time range)   magnesium sulfate 2000 mg in 50 mL IVPB premix (has no administration in time range)   promethazine (PHENERGAN) tablet 12.5 mg (has no administration in time range)     Or   ondansetron (ZOFRAN) injection 4 mg (has no administration in time range)   acetaminophen (TYLENOL) tablet 650 mg (has no administration in time range)     Or   acetaminophen (TYLENOL) suppository 650 mg (has no administration in time range)   0.9 % sodium chloride infusion (1,000 mLs IntraVENous New Bag 2/9/23 0045)   atropine injection 1 mg (has no administration in time range)   morphine sulfate (PF) injection 4 mg (4 mg IntraVENous Given 2/8/23 2358)   iopamidol (ISOVUE-370) 76 % injection 75 mL (75 mLs IntraVENous Given 2/8/23 2349)   nitroglycerin (NITRO-BID) 2 % ointment 1 inch (1 inch Topical Given 2/9/23 0039)   aspirin chewable tablet 324 mg (324 mg Oral Given 2/9/23 0039)   heparin (porcine) injection 4,000 Units (4,000 Units IntraVENous Given 2/9/23 0044)             Is this patient to be included in the SEP-1 Core Measure due to severe sepsis or septic shock? No   Exclusion criteria - the patient is NOT to be included for SEP-1 Core Measure due to: Infection is not suspected    Chronic Conditions:     CONSULTS: (Who and What was discussed)  IP CONSULT TO HOSPITALIST                CC/HPI Summary, DDx, ED Course, and Reassessment: Adult male who came in with acute onset of chest pain patient appears in no acute distress. An EKG was immediately obtained which showed ST segment elevation in V2. Code STEMI was activated. Patient was placed on cardiac palpation pulse oximeter, defibrillator pads were added on. Stat chest x-ray was ordered. I spoke to Dr. Katie Dinh with cardiology. He thinks that this is Brugada syndrome.   He recommends that I continue the patient's work-up with serial EKGs and evaluation for a pulmonary embolism. Chest x-ray was normal.  No widened mediastinum the patient had intact pulses. Little suspicion for aortic dissection at this time. Laboratory studies are done and are unremarkable except for mild elevation in his BUN and ALT. Patient has negative troponin. CT scan is read and interpreted by the radiologist negative for acute pulmonary embolism. Repeat EKG done immediately after showed similar findings however have a 1 hour EKG slightly improved according to the cardiologist.  He recommends nitroglycerin paste heparin and aspirin. Patient was initially given IV morphine for his pain. Shortly after the nitroglycerin was placed I was paged to the patient's room. Nurses noticed that he had become bradycardic. Patient became lightheaded and pale and clammy. He was hypotensive. Patient was placed in the supine position. The Nitropaste was removed patient given a liter bolus of normal saline. His heart rate and his blood pressure both normalized. An EKG was repeated which showed no acute changes. Patient will need to be admitted to the hospital for further care. Patient is agreeable. Hospitalist on duty is notified and made aware of the patient's presentation, diagnostic work-up decompensation in the ER and subsequent stabilization. He does agree to admit this patient to his service. Disposition Considerations (tests considered but not done, Shared Decision Making, Pt Expectation of Test or Tx.):         I am the Primary Clinician of Record. FINAL IMPRESSION      1. Chest pain, unspecified type    2. Thyroid nodule          DISPOSITION/PLAN     DISPOSITION Admitted 02/09/2023 01:25:44 AM      PATIENT REFERRED TO:  No follow-up provider specified.     DISCHARGE MEDICATIONS:  New Prescriptions    No medications on file       DISCONTINUED MEDICATIONS:  Discontinued Medications    No medications on file              (Please note that portions of this note were completed with a voice recognition program.  Efforts were made to edit the dictations but occasionally words are mis-transcribed.)    Keren Alvarez MD (electronically signed)           Keren Alvarez MD  02/09/23 5173

## 2023-02-09 NOTE — CONSULTS
[address] [facility]   Cardiovascular Evaluation    PATIENT: Tamera Ba  DATE: 2023  MRN: [id number]  CSN: [id number]  : 1980    Primary Care Doctor/Referring provider: Alicia Cox DO, Karlo Guerra DO     Reason for evaluation/Chief complaint:   Chest Pain (CP started with left arm and neck last night, intense pain started today while sleeping with SOB.)      Subjective:    History of present illness on initial date of evaluation:   Tamera Ba is a 40 y.o. patient who presents for the evaluation of chest pain x 2 days. The patient states that he had noticed onset of chest pain in the middle to left chest that was moderate in severity. The pain worsened during the prior 24 hours which prompted medical attention. The pain is non exertional. The pain is sharp and worse with inspiration and movement. The patient came to the ER for urgent evaluation. His initial EKG was abnormal and concern for possible STEMI. Interventional cardiology was contacted for consultation. The patient case was reviewed and the patient seen at bedside urgently for clinical evaluation. The patient was nontoxic during our history of present illness. His chest pain had mildly improved and was not radiating to the neck or jaw area. There was some mention of pain and numbness in the left arm. Patient has been treated with narcotics and has had improvement in the symptoms. Over the course of several hours, the EKG has been reviewed and serial troponin levels have been followed. Patient Active Problem List   Diagnosis    Acute appendicitis    Chest pain    Abnormal EKG         Cardiac Testing: I have reviewed the findings below. EKG:  ECHO:   STRESS TEST:  CATH:  BYPASS:  VASCULAR:    Past Medical History:   has no past medical history on file. Surgical History:   has a past surgical history that includes Cholecystectomy and Appendectomy.      Social History:   reports that he has never smoked. He has never used smokeless tobacco. He reports current alcohol use. He reports that he does not use drugs. Family History:  No evidence for sudden cardiac death or premature CAD    Medications:  Reviewed and are listed in nursing record. and/or listed below  Outpatient Medications:  Prior to Admission medications    Not on File       In-patient schedule medications:        Infusion Medications: Allergies:  Amoxicillin and Sulfa antibiotics     Review of Systems:   All 14 point review of symptoms completed. Pertinent positives identified in the HPI, all other review of symptoms findings as below.      Review of Systems - History obtained from the patient  General ROS: negative for - chills, fever or night sweats  Psychological ROS: negative for - disorientation or hallucinations  Ophthalmic ROS: negative for - dry eyes, eye pain or loss of vision  ENT ROS: negative for - nasal discharge or sore throat  Allergy and Immunology ROS: negative for - hives or itchy/watery eyes  Hematological and Lymphatic ROS: negative for - jaundice or night sweats  Endocrine ROS: negative for - mood swings or temperature intolerance  Breast ROS: deferred  Respiratory ROS: negative for - hemoptysis or stridor  Gastrointestinal ROS: no abdominal pain, change in bowel habits, or black or bloody stools  Genito-Urinary ROS: no dysuria, trouble voiding, or hematuria  Musculoskeletal ROS: negative for - gait disturbance, joint pain or joint stiffness  Neurological ROS: negative for - seizures or speech problems  Dermatological ROS: negative for - rash or skin lesion changes      Physical Examination:    [unfilled]  /81   Pulse 68   Temp 97.3 °F (36.3 °C) (Oral)   Resp 18   Ht 6' 5\" (1.956 m)   Wt 230 lb 11.2 oz (104.6 kg)   SpO2 95%   BMI 27.36 kg/m²    Weight: 230 lb 11.2 oz (104.6 kg)     Wt Readings from Last 3 Encounters:   02/09/23 230 lb 11.2 oz (104.6 kg)   12/14/20 217 lb (98.4 kg)   09/14/20 217 lb (98.4 kg)       Intake/Output Summary (Last 24 hours) at 2/9/2023 1044  Last data filed at 2/9/2023 0540  Gross per 24 hour   Intake 49 ml   Output 700 ml   Net -651 ml       General Appearance:  Alert, cooperative, no distress, appears stated age   Head:  Normocephalic, without obvious abnormality, atraumatic   Eyes:  PERRL, conjunctiva/corneas clear       Nose: Nares normal, no drainage or sinus tenderness   Throat: Lips, mucosa, and tongue normal   Neck: Supple, symmetrical, trachea midline, no adenopathy, thyroid: not enlarged, symmetric, no tenderness/mass/nodules, no carotid bruit or JVD       Lungs:   Clear to auscultation bilaterally, respirations unlabored   Chest Wall:  No tenderness or deformity   Heart:  Regular rhythm and normal rate; S1, S2 are normal; no murmur noted; no rub or gallop   Abdomen:   Soft, non-tender, bowel sounds active all four quadrants,  no masses, no organomegaly           Extremities: Extremities normal, atraumatic, no cyanosis or edema   Pulses: 2+ and symmetric   Skin: Skin color, texture, turgor normal, no rashes or lesions   Pysch: Normal mood and affect   Neurologic: Normal gross motor and sensory exam.         Labs  Recent Labs     02/08/23 2333   WBC 8.6   HGB 15.7   HCT 45.2   MCV 90.7        Recent Labs     02/08/23 2333   CREATININE 1.1   BUN 22*      K 3.9      CO2 28     No results for input(s): INR, PROTIME in the last 72 hours. Recent Labs     02/08/23  2333 02/09/23  0632   TROPONINI <0.01 <0.01     Invalid input(s): PRO-BNP  No results for input(s): CHOL, LDL, HDL in the last 72 hours. Invalid input(s): TG      Imaging:  I have reviewed the below testing personally and my interpretation is below. EKG:    Serially EKGS reviewed and show pattern of Brugada that improved.   Normal sinus rhythm  When compared with ECG of 09-FEB-2023 00:54,  No significant change was found    CXR:      Assessment:  40 y.o. patient with:  Principal Problem:    Chest pain  Active Problems:    Abnormal EKG  Resolved Problems:    * No resolved hospital problems. *      Problem List Items Addressed This Visit       * (Principal) Chest pain - Primary     Other Visit Diagnoses       Thyroid nodule                Plan:  The patient EKG is abnormal but not consistent with ACS. ~possible criteria for Brugada EKG. ~will have EP review  Serially EKG and troponin levels are improved or normal.   ~will go ahead and DC heparin as ACS and PE have been excluded. Urgent bedside echocardiogram personally reviewed and shows normal wall motion and no pericardial disease. ESR and CRP levels  Will discussed needs for ischemic work-up with non-invasive testing vs angiogram.   ~patient wished to proceed with angiogram   ~1. I had the opportunity to review the Page Benz clinical presentation and the available pertinent data. With the patient's clinical risk factors and symptoms, there is a high pretest likelihood for CAD. I have asked Page Benz to undergo cardiac angiography for further diagnostic testing. The procedure was explained in depth. All questions and alternate treatment strategies were discussed. The patient agrees to proceed. They understand all the risks associated with the procedure, including myocardial infarction, stroke, death, vascular complications, and the possible need for emergent surgery. Keep NPO   Pre-cath orders will be placed. Medical Decision Making: The following items were considered in medical decision making:  Independent review of images  Review / order clinical lab tests  Review / order radiology tests  Decision to obtain old records  Review and summation of old records as accessed through [facility] (a summary of my findings in these old records)      Time Based Itemization  A total of 80 minutes was spent on today's patient encounter.   If applicable, non-patient-facing activities:  (X )Preparing to see the patient and reviewing records  (X ) Individual interpretation of results  ( ) Discussion or coordination of care with other health care professionals  (X ) Ordering of unique tests, medications, or procedures  (X ) Documentation within the EHR       All questions and concerns were addressed to the patient/family. Alternatives to my treatment were discussed. The note was completed using EMR. Every effort was made to ensure accuracy; however, inadvertent computerized transcription errors may be present.     Ema Lafleur MD, [address], [facility]  [phone] [facility] office  [phone] [facility]  2/9/2023  10:44 AM

## 2023-02-09 NOTE — DISCHARGE SUMMARY
40 y.o. male who presented to [facility] with past medical history appendectomy, cholecystectomy presented to ED with chief complaint of chest pain    Patient reports that he is healthy and functional independent positive. IBS diagnosed years ago otherwise patient denied history of hypertension, hyperlipidemia reports that he has no family history of sudden cardiac death in the family. Patient reports that he has been having some chest pressure yesterday night progressively worsening and due to being intermittent and now with associated diaphoresis patient came for further evaluation described as mid to left side chest.  Patient baseline works at [employer] and is able to climb 7 flights of stairs patient otherwise denies smoking drinking and drugs        Chest pain:  Initial concern for STEMI vs Brugada pattern  Ruled out for ACS  ECHO normal.   Cardiology recommended Angiogram to ruled out ischemia and found to be normal  Concern for intermittent Brugada pattern vs Brugada syndrome. Cased was reviewed with EP and they recommended outpatient cardiac monitoring and outpatient EP follow-up. 1.7 cm thyroid nodule,  TSH pending  Will need ultrasound thyroid outpatient follow-up    Suspected VALENTIN:  Last creatinine 0.7 currently at 1.1  BUN elevated with lactic acidosis  IVF and recheck    Elevated ALT: Recheck in a.m. Face-to-face discussion with the primary ER physician in regards to symptoms, history, physical exam, diagnosis and treatment, collaborative decision was to admit the patient.     Diet: NPO except meds ordered    DVT Prophylaxis: heparin drip    Dispo:   Expected LOS of two days         Simone Mayer MD

## 2023-02-09 NOTE — PROGRESS NOTES
0540-Pt c/o pain 5/10-pt having increased chest pain with deep inspiration. Gave 2mg Morphine. 0608-Reassessment, pt's pain is 4/10, pt feels much better and denies need for any other pain medication at this time.

## 2023-02-09 NOTE — CONSULTS
Pharmacy to Manage Heparin Infusion per Hospital Policy    Diagnosis: Chest pain  Patient weight = 99.8 kg (will use adjusted wt if actual body weight > 120% ideal body weight). Baseline aPTT = 29.8 at 2333    History of Anti-Xa Inhibitors (Apixaban, Rivaroxaban, Edoxaban)?: None; will use anti-Xa monitoring    Heparin (weight-based) Infusion: CAD/STEMI/NSTEMI/UA/AFib)   Heparin 60 units/kg IVP bolus (max 4,000 units) followed by Heparin infusion at 12 units/kg/hr (recommended max initial rate: 1000 units/hr). Recheck anti-Xa (unless aPTT being used) in 6 hours. Goal anti-Xa 0.3-0.7 IU/mL    Plan:  Low-Dose Heparin Drip  Plan: Per pharmacy dosing, we will start heparin drip with a bolus dose of 4000 units and drip rate of 1000 units/hr    Next Anti-Xa Level: 2/9 @ [address], PharmD 2/9/2023 12:30 AM    2/9/2023 at 2136  Anti-Xa Unfrac Heparin   <0.10   IU/mL  - Heparin _4000_ units IVP bolus and then increase Heparin infusion to _1400_ units/hr. - Recheck Anti-Xa in 6 hours at 1430.   Alanis Jerome, PharmD    2/9/2023 8:33 AM  ______________________________________________________________

## 2023-02-09 NOTE — ED NOTES
[address] [place] [address] Cardiology  Heriberto Vargas MD called back     Amy Silva  02/08/23 0491

## 2023-02-09 NOTE — PROGRESS NOTES
[address] Daily Progress/Follow-up Note      Admit Date:  2/8/2023    CHIEF COMPLAINT  Chest pain      INTERVAL HISTORY:  Mr. Sixto Urbano with no significant past medical history, is admitted for work-up of chest pain. No further chest pain or any new symptoms overnight or this morning. No significant rhythm events noted on telemetry. Patient denies chest pain/heaviness/pressure, palpitations, dyspnea, orthopnea, edema, lightheadedness, syncope. Const: denies fever/chills  Cardiovascular: per HPI above  Pulmonary: denies cough, denies wheezing  GI: denies N/V  MSK: denies joint effusions or edema  Hematological: denies bleeding  Neurological: denies focal weakness or parasthesia  ENT: denies sore throat  Eyes: denies sudden vision loss  Genitourinary: denies dysuria    CARDIAC MEDICATIONS        VITALS  /89   Pulse (!) 101   Temp 97.7 °F (36.5 °C) (Oral)   Resp 18   Ht 6' 5\" (1.956 m)   Wt 230 lb 11.2 oz (104.6 kg)   SpO2 94%   BMI 27.36 kg/m²     Intake/Output Summary (Last 24 hours) at 2/9/2023 1316  Last data filed at 2/9/2023 0540  Gross per 24 hour   Intake 49 ml   Output 700 ml   Net -651 ml       TELEMETRY: No significant arrhythmias noted overnight    PHYSICAL EXAM:  General: In no acute distress. Appears stated age. Cardiac: Normal S1 and S2. No murmur, gallop or rub. Normal apical impulse in the mid-clavicular line. No abnormal pulsations/heaves. No LE edema. Vascular: Carotid pulses equal bilaterally without bruit. Pulses 2+ and equal bilateral lower extremities  Eye: PERRL. EOMI. Conjunctiva clear. Neck: No JVD. Trachea midline. Normal ROM. No thyromegaly or adenopathy  Pulmonary: Clear to auscultation, without rales or wheezing. No accessory muscle use or respiratory distress noted. Abdomen: Soft, Non Tender, Normal bowel sounds, No organomegally/mass  Musculoskeletal: No joint edema or erythema. No digital cyanosis or clubbing. Normal gait and station.   Neuro: CN II-XII grossly intact. Sensation is intact to light touch in all ext, no sensory  deficit. Skin: Warm and dry, with normal turgor. No bruises, rashes or ulcers      LABS:  Recent Labs     02/08/23 2333   WBC 8.6   HGB 15.7   HCT 45.2   MCV 90.7        Recent Labs     02/08/23 2333      K 3.9      CO2 28   BUN 22*   CREATININE 1.1     No results for input(s): PROTIME, INR in the last 72 hours. Recent Labs     02/08/23 2333   APTT 29.8     No results for input(s): BNP in the last 72 hours. CARDIAC STUDIES    ECG -sinus rhythm. One of the ECGs since admission suggests Brugada pattern    Echo - 2/9/23   Normal echocardiogram with estimated LVEF 50-55%. Normal wall thickness and wall motion. Normal function of all valves. No evidence of pericardial effusion. Stress test - n/a    Catheterization - n/a      ASSESSMENT/PLAN    Chest pain - while Mr. Arabella Villegas does not have any major risk factors for coronary artery disease, his symptoms are very typical and concerning for unstable coronary disease. I discussed at length the option of noninvasive versus invasive work-up and Mr. Arabella Villegas is leaning strongly towards coronary angiogram.  LV function is normal on echo without significant wall motion or valvular abnormalities. 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 He denies any history of palpitations or syncope. He also denies any family history of sudden cardiac death. - Will arrange follow-up with outpatient EP to discuss potential need for further work-up of Brugada pattern versus Brugada syndrome  Thank you for the consult, please call with questions.     Dio Wright MD, [facility] - [place], [place]  Interventional Cardiology  [address]  [phone] (c)  2/9/2023 1:16 PM      Inadvertent computerized transcription errors may be present

## 2023-02-10 ENCOUNTER — TELEPHONE (OUTPATIENT)
Dept: FAMILY MEDICINE CLINIC | Age: 43
End: 2023-02-10

## 2023-02-10 NOTE — TELEPHONE ENCOUNTER
[address] [facility] Transitions Initial Follow Up Call    Outreach made within 2 business days of discharge: Yes    Patient: Celestina Medina Patient : 1980   MRN: [id number]  Reason for Admission: There are no discharge diagnoses documented for the most recent discharge. Discharge Date: 23       Spoke with: PT scheduled TCM with PCP. Discharge department/facility: [facility]    Non-face-to-face services provided:  Scheduled appointment with PCP-2023  Obtained and reviewed discharge summary and/or continuity of care documents    TCM Interactive Patient Contact:  Was patient able to fill all prescriptions: Yes  Was patient instructed to bring all medications to the follow-up visit: Yes  Is patient taking all medications as directed in the discharge summary?  Yes  Does patient understand their discharge instructions: Yes  Does patient have questions or concerns that need addressed prior to 7-14 day follow up office visit: no    Scheduled appointment with PCP within 7-14 days    Follow Up  Future Appointments   Date Time Provider Helena Haile   2023 11:00 AM DO MARIELOS Hewitt [place] [facility]   2023  9:00 AM Aracely Gamez MD [facility]       Shira Francis LPN

## 2023-02-21 ENCOUNTER — OFFICE VISIT (OUTPATIENT)
Dept: FAMILY MEDICINE CLINIC | Age: 43
End: 2023-02-21

## 2023-02-21 VITALS — SYSTOLIC BLOOD PRESSURE: 102 MMHG | OXYGEN SATURATION: 97 % | DIASTOLIC BLOOD PRESSURE: 66 MMHG | HEART RATE: 73 BPM

## 2023-02-21 DIAGNOSIS — E04.1 THYROID NODULE GREATER THAN OR EQUAL TO 1.5 CM IN DIAMETER INCIDENTALLY NOTED ON IMAGING STUDY: Primary | ICD-10-CM

## 2023-02-21 DIAGNOSIS — R07.9 CHEST PAIN, UNSPECIFIED TYPE: ICD-10-CM

## 2023-02-21 DIAGNOSIS — Z09 HOSPITAL DISCHARGE FOLLOW-UP: ICD-10-CM

## 2023-02-21 DIAGNOSIS — R94.31 ABNORMAL EKG: ICD-10-CM

## 2023-02-21 ASSESSMENT — PATIENT HEALTH QUESTIONNAIRE - PHQ9
SUM OF ALL RESPONSES TO PHQ QUESTIONS 1-9: 0
SUM OF ALL RESPONSES TO PHQ9 QUESTIONS 1 & 2: 0
1. LITTLE INTEREST OR PLEASURE IN DOING THINGS: 0
2. FEELING DOWN, DEPRESSED OR HOPELESS: 0

## 2023-02-21 NOTE — PROGRESS NOTES
Post-Discharge Transitional Care  Follow Up      Chryl Do   YOB: 1980    Date of Office Visit:  2/21/2023  Date of Hospital Admission: 2/8/23  Date of Hospital Discharge: 2/9/23  Risk of hospital readmission (high >=14%. Medium >=10%) :No data recorded    Care management risk score Rising risk (score 2-5) and Complex Care (Scores >=6): No Risk Score On File     Non face to face  following discharge, date last encounter closed (first attempt may have been earlier): 02/10/2023    Call initiated 2 business days of discharge: Yes    ASSESSMENT/PLAN:   Thyroid nodule greater than or equal to 1.5 cm in diameter incidentally noted on imaging study  -     US THYROID; Future  -     TSH; Future  -     T4, Free; Future  -     T3, Free; Future  Hospital discharge follow-up  -     AL DISCHARGE MEDS RECONCILED W/ CURRENT OUTPATIENT MED LIST  Chest pain, unspecified type  Abnormal EKG  Underwent angiogram during hospitilization which was normal. Has planned follow up with EP and currently wearing cardiac monitor due to concern for burgada pattern EKG on 2/4 EKGs. Also incidental noted thyroid nodule. Lab work and ultrasound ordered. Medical Decision Making: moderate complexity  No follow-ups on file. Subjective:   HPI:  Follow up of Hospital problems/diagnosis(es):     Inpatient course: Discharge summary reviewed- see chart. Interval history/Current status:     Chest pain  Atypical  EKG with brugada pattern  Angio - normal  Wearing holter and follow up with EP  Asymptomatic currently    Thyroid nodule  Asymptomatic. No history of thyroid disease  Brother passed with brain cancer  Grandmother with leukemia    Patient Active Problem List   Diagnosis    Acute appendicitis    Chest pain    Abnormal EKG       Medications listed as ordered at the time of discharge from hospital     Medication List      as of February 21, 2023 11:59 PM     You have not been prescribed any medications. Medications marked \"taking\" at this time  No outpatient medications have been marked as taking for the 2/21/23 encounter (Office Visit) with Deri Litten, DO.        Medications patient taking as of now reconciled against medications ordered at time of hospital discharge: Yes        Objective:    /66 (Site: Right Upper Arm, Position: Sitting, Cuff Size: Medium Adult)   Pulse 73   SpO2 97%         An electronic signature was used to authenticate this note.   --Deri Litten, DO

## 2023-02-23 ENCOUNTER — HOSPITAL ENCOUNTER (OUTPATIENT)
Dept: ULTRASOUND IMAGING | Age: 43
Discharge: HOME | End: 2023-02-23
Payer: COMMERCIAL

## 2023-02-23 DIAGNOSIS — E04.1 THYROID NODULE GREATER THAN OR EQUAL TO 1.5 CM IN DIAMETER INCIDENTALLY NOTED ON IMAGING STUDY: ICD-10-CM

## 2023-02-23 LAB
T3 FREE: 3.3 PG/ML (ref 2.3–4.2)
T4 FREE: 1.3 NG/DL (ref 0.9–1.8)
TSH SERPL DL<=0.05 MIU/L-ACNC: 1.09 UIU/ML (ref 0.27–4.2)

## 2023-02-23 PROCEDURE — 76536 US EXAM OF HEAD AND NECK: CPT

## 2023-02-28 ENCOUNTER — TELEPHONE (OUTPATIENT)
Dept: FAMILY MEDICINE CLINIC | Age: 43
End: 2023-02-28

## 2023-02-28 DIAGNOSIS — E04.1 THYROID NODULE: Primary | ICD-10-CM

## 2023-02-28 NOTE — TELEPHONE ENCOUNTER
Attempted to call patient with results of recent thyroid ultrasound. No answer. Recuent thyroid ultrasound shows two nodules that look abnormal and would recommend having a biopsy performed as we discussed. I have placed a referral to ENT to have this done. This does not mean that they are cancerous but does mean that they should be further evaluated. Please let me know if patient calls back and has any questions.

## 2023-03-01 NOTE — TELEPHONE ENCOUNTER
Spoke with patient and relayed results. He scheduled an appt with Dr. Irwin Barton with ENT for 3/3.

## 2023-03-03 ENCOUNTER — OFFICE VISIT (OUTPATIENT)
Dept: ENT CLINIC | Age: 43
End: 2023-03-03
Payer: COMMERCIAL

## 2023-03-03 VITALS — RESPIRATION RATE: 16 BRPM | HEIGHT: 77 IN | BODY MASS INDEX: 27.16 KG/M2 | WEIGHT: 230 LBS | TEMPERATURE: 97.3 F

## 2023-03-03 DIAGNOSIS — E04.2 MULTIPLE THYROID NODULES: Primary | ICD-10-CM

## 2023-03-03 PROCEDURE — 99203 OFFICE O/P NEW LOW 30 MIN: CPT | Performed by: OTOLARYNGOLOGY

## 2023-03-03 ASSESSMENT — ENCOUNTER SYMPTOMS
SHORTNESS OF BREATH: 0
EYE ITCHING: 0
FACIAL SWELLING: 0
SINUS PRESSURE: 0
APNEA: 0
TROUBLE SWALLOWING: 0
COUGH: 0
VOICE CHANGE: 0
SORE THROAT: 0

## 2023-03-03 NOTE — PROGRESS NOTES
[address], [address], [address] [place] [address]  P: [phone]       Patient     Booker Jaramillo  1980    ChiefComplaint     Chief Complaint   Patient presents with    New Patient     States that he is here after 2 thyroid nodules were found on CT recently       History of Present Illness     Suzy Wang is a 18-year-old male here today for evaluation of multiple thyroid nodules. Incidentally found on CT scan. No family history of thyroid cancer no history of radiation of the head or the neck. Reports he is asymptomatic. There is significant family history for multiple other types of carcinoma. Past Medical History     No past medical history on file. Past Surgical History     Past Surgical History:   Procedure Laterality Date    APPENDECTOMY      CHOLECYSTECTOMY         Family History     Family History   Problem Relation Age of Onset    Diabetes Mother     Mult Sclerosis Mother     Lupus Mother     Cancer Brother         Brain CA    Brain Cancer Brother     Diabetes Maternal Grandmother     Cancer Maternal Grandmother         leukemia       Social History     Social History     Tobacco Use    Smoking status: Never    Smokeless tobacco: Never   Vaping Use    Vaping Use: Never used   Substance Use Topics    Alcohol use: Yes     Comment: socially    Drug use: No        Allergies     Allergies   Allergen Reactions    Amoxicillin Anaphylaxis    Sulfa Antibiotics      Possibly- rash       Medications     No current outpatient medications on file. No current facility-administered medications for this visit. Review of Systems     Review of Systems   Constitutional:  Negative for appetite change, chills, fatigue, fever and unexpected weight change. HENT:  Negative for congestion, ear discharge, ear pain, facial swelling, hearing loss, nosebleeds, postnasal drip, sinus pressure, sneezing, sore throat, tinnitus, trouble swallowing and voice change.     Eyes:  Negative for itching. Respiratory:  Negative for apnea, cough and shortness of breath. Endocrine: Negative for cold intolerance and heat intolerance. Musculoskeletal:  Negative for myalgias and neck pain. Skin:  Negative for rash. Allergic/Immunologic: Negative for environmental allergies. Neurological:  Negative for dizziness and headaches. Psychiatric/Behavioral:  Negative for confusion, decreased concentration and sleep disturbance. PhysicalExam     Vitals:    03/03/23 1126   Resp: 16   Temp: 97.3 °F (36.3 °C)   TempSrc: Infrared   Weight: 230 lb (104.3 kg)   Height: 6' 5\" (1.956 m)       Physical Exam  Constitutional:       General: He is not in acute distress. Appearance: He is well-developed. HENT:      Head: Normocephalic and atraumatic. Right Ear: Tympanic membrane, ear canal and external ear normal. No drainage. No middle ear effusion. Tympanic membrane is not bulging. Tympanic membrane has normal mobility. Left Ear: Tympanic membrane, ear canal and external ear normal. No drainage. No middle ear effusion. Tympanic membrane is not bulging. Tympanic membrane has normal mobility. Nose: No mucosal edema or rhinorrhea. Mouth/Throat:      Lips: Pink. Mouth: Mucous membranes are moist.      Tongue: No lesions. Palate: No mass. Pharynx: Uvula midline. Eyes:      Pupils: Pupils are equal, round, and reactive to light. Neck:      Thyroid: No thyroid mass (right thyroid nodule) or thyromegaly. Trachea: Trachea and phonation normal.   Cardiovascular:      Pulses: Normal pulses. Pulmonary:      Effort: Pulmonary effort is normal. No accessory muscle usage or respiratory distress. Breath sounds: No stridor. Musculoskeletal:      Cervical back: Full passive range of motion without pain. Lymphadenopathy:      Head:      Right side of head: No submental or submandibular adenopathy. Left side of head: No submental or submandibular adenopathy. Cervical: No cervical adenopathy. Right cervical: No superficial, deep or posterior cervical adenopathy. Left cervical: No superficial, deep or posterior cervical adenopathy. Skin:     General: Skin is warm and dry. Neurological:      Mental Status: He is alert and oriented to person, place, and time. Cranial Nerves: No cranial nerve deficit. Coordination: Coordination normal.      Gait: Gait normal.   Psychiatric:         Thought Content: Thought content normal.           Assessment and Plan     1. Multiple thyroid nodules  -Ultrasound reviewed in detail with patient  -Plan for biopsy of right and left thyroid nodule-understands only 2 or 3 nodules will be biopsied  -Discussed for potential of nondiagnostic thyroid biopsy  - US FINE NEEDLE ASPIRATION; Future    I will call with results    Swati Chua DO  3/3/23      Portions of this note were dictated using Dragon.  There may be linguistic errors secondary to the use of this program.

## 2023-03-06 ENCOUNTER — HOSPITAL ENCOUNTER (OUTPATIENT)
Dept: ULTRASOUND IMAGING | Age: 43
Discharge: HOME | End: 2023-03-06
Payer: COMMERCIAL

## 2023-03-06 DIAGNOSIS — E04.2 MULTIPLE THYROID NODULES: ICD-10-CM

## 2023-03-06 PROCEDURE — 88305 TISSUE EXAM BY PATHOLOGIST: CPT

## 2023-03-06 PROCEDURE — 60100 BIOPSY OF THYROID: CPT

## 2023-03-06 PROCEDURE — 88173 CYTOPATH EVAL FNA REPORT: CPT

## 2023-03-06 NOTE — PROGRESS NOTES
RADIOLOGY:  Patient status post ultrasound guided FNA of bilateral thyroid gland nodules. Patient tolerated the procedure well. Full report to follow.

## 2023-03-08 ENCOUNTER — TELEPHONE (OUTPATIENT)
Dept: ENT CLINIC | Age: 43
End: 2023-03-08

## 2023-03-08 DIAGNOSIS — C73 PAPILLARY THYROID CARCINOMA (HCC): Primary | ICD-10-CM

## 2023-03-08 NOTE — TELEPHONE ENCOUNTER
Spoke with Vickie Rodriguez regarding results of thyroid biopsy. 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 Referral placed.

## 2023-03-17 DIAGNOSIS — R94.31 ABNORMAL EKG: ICD-10-CM

## 2023-03-28 ENCOUNTER — OFFICE VISIT (OUTPATIENT)
Dept: FAMILY MEDICINE CLINIC | Age: 43
End: 2023-03-28
Payer: COMMERCIAL

## 2023-03-28 VITALS
SYSTOLIC BLOOD PRESSURE: 118 MMHG | BODY MASS INDEX: 27.39 KG/M2 | DIASTOLIC BLOOD PRESSURE: 72 MMHG | OXYGEN SATURATION: 98 % | HEART RATE: 86 BPM | WEIGHT: 231 LBS

## 2023-03-28 DIAGNOSIS — C73 PAPILLARY THYROID CARCINOMA (HCC): ICD-10-CM

## 2023-03-28 DIAGNOSIS — Z01.818 PRE-OP EXAMINATION: ICD-10-CM

## 2023-03-28 DIAGNOSIS — Z01.818 PRE-OP EXAMINATION: Primary | ICD-10-CM

## 2023-03-28 LAB
ANION GAP SERPL CALCULATED.3IONS-SCNC: 18 MMOL/L (ref 3–16)
BUN SERPL-MCNC: 14 MG/DL (ref 7–20)
CALCIUM SERPL-MCNC: 10.2 MG/DL (ref 8.3–10.6)
CHLORIDE SERPL-SCNC: 108 MMOL/L (ref 99–110)
CO2 SERPL-SCNC: 21 MMOL/L (ref 21–32)
CREAT SERPL-MCNC: 1 MG/DL (ref 0.9–1.3)
DEPRECATED RDW RBC AUTO: 12.8 % (ref 12.4–15.4)
GFR SERPLBLD CREATININE-BSD FMLA CKD-EPI: >60 ML/MIN/{1.73_M2}
GLUCOSE SERPL-MCNC: 103 MG/DL (ref 70–99)
HCT VFR BLD AUTO: 45.4 % (ref 40.5–52.5)
HGB BLD-MCNC: 15.3 G/DL (ref 13.5–17.5)
MCH RBC QN AUTO: 30.8 PG (ref 26–34)
MCHC RBC AUTO-ENTMCNC: 33.7 G/DL (ref 31–36)
MCV RBC AUTO: 91.2 FL (ref 80–100)
PLATELET # BLD AUTO: 168 K/UL (ref 135–450)
PMV BLD AUTO: 11.1 FL (ref 5–10.5)
POTASSIUM SERPL-SCNC: 4.6 MMOL/L (ref 3.5–5.1)
RBC # BLD AUTO: 4.98 M/UL (ref 4.2–5.9)
SODIUM SERPL-SCNC: 147 MMOL/L (ref 136–145)
WBC # BLD AUTO: 5.8 K/UL (ref 4–11)

## 2023-03-28 PROCEDURE — 99214 OFFICE O/P EST MOD 30 MIN: CPT | Performed by: STUDENT IN AN ORGANIZED HEALTH CARE EDUCATION/TRAINING PROGRAM

## 2023-03-28 NOTE — PROGRESS NOTES
Eosinophils Absolute 02/08/2023 0.1     Basophils Absolute 02/08/2023 0.1     Sodium 02/08/2023 139     Potassium 02/08/2023 3.9     Chloride 02/08/2023 102     CO2 02/08/2023 28     Anion Gap 02/08/2023 9     Glucose 02/08/2023 107 (A)     BUN 02/08/2023 22 (A)     Creatinine 02/08/2023 1.1     Est, Glom Filt Rate 02/08/2023 >60     Calcium 02/08/2023 9.7     Total Protein 02/08/2023 7.4     Albumin 02/08/2023 4.7     Albumin/Globulin Ratio 02/08/2023 1.7     Total Bilirubin 02/08/2023 0.4     Alkaline Phosphatase 02/08/2023 77     ALT 02/08/2023 48 (A)     AST 02/08/2023 24     Troponin 02/08/2023 <0.01     aPTT 02/08/2023 29.8     Ventricular Rate 02/09/2023 83     Atrial Rate 02/09/2023 83     P-R Interval 02/09/2023 162     QRS Duration 02/09/2023 108     Q-T Interval 02/09/2023 364     QTc Calculation (Bazett) 02/09/2023 427     P Axis 02/09/2023 40     R Axis 02/09/2023 44     T Axis 02/09/2023 39     Diagnosis 02/09/2023 Normal sinus rhythmNonspecific ST abnormalityAbnormal ECGWhen compared with ECG of 09-FEB-2023 00:07,this EKG does not have Brugada patternConfirmed by Delmi Berman MD, [address] (1985) on 2/9/2023 12:23:47 PM     Ventricular Rate 02/09/2023 64     Atrial Rate 02/09/2023 64     P-R Interval 02/09/2023 150     QRS Duration 02/09/2023 106     Q-T Interval 02/09/2023 396     QTc Calculation (Bazett) 02/09/2023 408     P [place] 02/09/2023 34     R Axis 02/09/2023 45     T [place] 02/09/2023 31     Diagnosis 02/09/2023 Normal sinus rhythmBrugada pattern, type 1Abnormal ECGWhen compared with ECG of 09-FEB-2023 00:08,intermittent Brugada pattern. Confirmed by Delmi Berman MD, [address] (1985) on 2/9/2023 12:24:31 PM     Color, UA 02/09/2023 Yellow     Clarity, UA 02/09/2023 Clear     Glucose, Ur 02/09/2023 Negative     Bilirubin Urine 02/09/2023 Negative     Ketones, Urine 02/09/2023 Negative     Specific Gravity, UA 02/09/2023 <=1.005     Blood, Urine 02/09/2023 Negative     pH, UA 02/09/2023 6.5     Protein, UA 02/09/2023

## 2023-04-24 ENCOUNTER — OFFICE VISIT (OUTPATIENT)
Dept: CARDIOLOGY CLINIC | Age: 43
End: 2023-04-24
Payer: COMMERCIAL

## 2023-04-24 VITALS
BODY MASS INDEX: 30.93 KG/M2 | WEIGHT: 233.4 LBS | HEART RATE: 91 BPM | DIASTOLIC BLOOD PRESSURE: 82 MMHG | SYSTOLIC BLOOD PRESSURE: 118 MMHG | OXYGEN SATURATION: 97 % | HEIGHT: 73 IN

## 2023-04-24 DIAGNOSIS — I49.8 BRUGADA PATTERN ON ELECTROCARDIOGRAM: Primary | ICD-10-CM

## 2023-04-24 DIAGNOSIS — R00.2 PALPITATIONS: ICD-10-CM

## 2023-04-24 DIAGNOSIS — R07.9 CHEST PAIN, UNSPECIFIED TYPE: ICD-10-CM

## 2023-04-24 DIAGNOSIS — R94.31 ABNORMAL ECG: ICD-10-CM

## 2023-04-24 PROCEDURE — 93000 ELECTROCARDIOGRAM COMPLETE: CPT | Performed by: INTERNAL MEDICINE

## 2023-04-24 PROCEDURE — 99204 OFFICE O/P NEW MOD 45 MIN: CPT | Performed by: INTERNAL MEDICINE

## 2023-04-24 RX ORDER — LEVOTHYROXINE SODIUM 0.15 MG/1
TABLET ORAL
COMMUNITY
Start: 2023-04-07

## 2023-04-24 RX ORDER — PSEUDOEPHED/ACETAMINOPH/DIPHEN 30MG-500MG
TABLET ORAL
COMMUNITY
Start: 2023-04-07

## 2023-04-24 ASSESSMENT — ENCOUNTER SYMPTOMS
RIGHT EYE: 0
LEFT EYE: 0
HEMATOCHEZIA: 0
HEMATEMESIS: 0
STRIDOR: 0
WHEEZING: 0
SHORTNESS OF BREATH: 0

## 2023-04-24 NOTE — PATIENT INSTRUCTIONS
Plan:     Discussed genetic testing for Brugada. - do not eat, drink, smoke, chew gum/mint, put on chapstick, etc. 30 minutes prior to testing   Discussed the risks and benefits of an implantable loop recorder   If you have a fever, treat this as soon as possible with antipyretics (tylenol) and ice baths. Monitor for symptoms such as intense palpitations, shortness of breath, chest pain, etc.   Recommend [employer] or Apple Watch for rhythm monitoring. Continue taking current medications as prescribed. Follow up with me based on genetic test results or as needed.

## 2023-04-25 ENCOUNTER — NURSE ONLY (OUTPATIENT)
Dept: CARDIOLOGY CLINIC | Age: 43
End: 2023-04-25

## 2023-04-25 NOTE — PROGRESS NOTES
Patient presented today for scheduled genetic testing. Specimen obtained per [facility] protocol. Required paperwork provided.

## 2023-05-09 ENCOUNTER — TELEPHONE (OUTPATIENT)
Dept: CARDIOLOGY CLINIC | Age: 43
End: 2023-05-09

## 2023-05-09 NOTE — TELEPHONE ENCOUNTER
Received faxed from [employer]. Results scanned into media and routed to [address] [place] [address] to review.

## 2023-05-12 NOTE — TELEPHONE ENCOUNTER
Rachel Baltazar MD  You; Ibeth Sorto, RN 16 minutes ago (1:16 PM)     KA  Please call this patient and let him know that his genetic testing panel did not detect known mutations related to Brugada disease. This does not change overall plan. It does NOT mean he does not have Brugada syndrome (just that he doesn't match any of the handful of known mutations). He should follow the precautions and recommendations we discussed in clinic. We can see him in clinic as needed or if he has concerns. Thank you    I attempted to call the patient to discuss results. KATHERYN.

## 2023-05-12 NOTE — TELEPHONE ENCOUNTER
Patient called office back. Relayed KXA results with patient, aneesh AYALA. He states he will call office if he had any concerns that were discussed in [address] with [address] [place] [address].

## 2023-10-24 SDOH — ECONOMIC STABILITY: FOOD INSECURITY: WITHIN THE PAST 12 MONTHS, THE FOOD YOU BOUGHT JUST DIDN'T LAST AND YOU DIDN'T HAVE MONEY TO GET MORE.: NEVER TRUE

## 2023-10-24 SDOH — ECONOMIC STABILITY: INCOME INSECURITY: HOW HARD IS IT FOR YOU TO PAY FOR THE VERY BASICS LIKE FOOD, HOUSING, MEDICAL CARE, AND HEATING?: SOMEWHAT HARD

## 2023-10-24 SDOH — ECONOMIC STABILITY: HOUSING INSECURITY
IN THE LAST 12 MONTHS, WAS THERE A TIME WHEN YOU DID NOT HAVE A STEADY PLACE TO SLEEP OR SLEPT IN A SHELTER (INCLUDING NOW)?: NO

## 2023-10-24 SDOH — ECONOMIC STABILITY: TRANSPORTATION INSECURITY
IN THE PAST 12 MONTHS, HAS LACK OF TRANSPORTATION KEPT YOU FROM MEETINGS, WORK, OR FROM GETTING THINGS NEEDED FOR DAILY LIVING?: NO

## 2023-10-24 SDOH — ECONOMIC STABILITY: FOOD INSECURITY: WITHIN THE PAST 12 MONTHS, YOU WORRIED THAT YOUR FOOD WOULD RUN OUT BEFORE YOU GOT MONEY TO BUY MORE.: NEVER TRUE

## 2023-10-26 ENCOUNTER — OFFICE VISIT (OUTPATIENT)
Dept: FAMILY MEDICINE CLINIC | Age: 43
End: 2023-10-26
Payer: COMMERCIAL

## 2023-10-26 VITALS
SYSTOLIC BLOOD PRESSURE: 120 MMHG | BODY MASS INDEX: 30.35 KG/M2 | OXYGEN SATURATION: 97 % | WEIGHT: 229 LBS | DIASTOLIC BLOOD PRESSURE: 82 MMHG | HEART RATE: 86 BPM | HEIGHT: 73 IN

## 2023-10-26 DIAGNOSIS — Z85.850 HX OF PAPILLARY THYROID CARCINOMA: ICD-10-CM

## 2023-10-26 DIAGNOSIS — Z23 INFLUENZA VACCINE NEEDED: ICD-10-CM

## 2023-10-26 DIAGNOSIS — F32.1 CURRENT MODERATE EPISODE OF MAJOR DEPRESSIVE DISORDER WITHOUT PRIOR EPISODE (HCC): Primary | ICD-10-CM

## 2023-10-26 DIAGNOSIS — E89.0 POSTOPERATIVE HYPOTHYROIDISM: ICD-10-CM

## 2023-10-26 PROCEDURE — 99214 OFFICE O/P EST MOD 30 MIN: CPT | Performed by: STUDENT IN AN ORGANIZED HEALTH CARE EDUCATION/TRAINING PROGRAM

## 2023-10-26 PROCEDURE — 90471 IMMUNIZATION ADMIN: CPT | Performed by: STUDENT IN AN ORGANIZED HEALTH CARE EDUCATION/TRAINING PROGRAM

## 2023-10-26 PROCEDURE — 90674 CCIIV4 VAC NO PRSV 0.5 ML IM: CPT | Performed by: STUDENT IN AN ORGANIZED HEALTH CARE EDUCATION/TRAINING PROGRAM

## 2023-10-26 NOTE — PROGRESS NOTES
Assessment:  Encounter Diagnoses   Name Primary? Current moderate episode of major depressive disorder without prior episode ([address]) Yes    Influenza vaccine needed     Hx of papillary thyroid carcinoma     Postoperative hypothyroidism        Plan:  1. Current moderate episode of major depressive disorder without prior episode [facility])  Assessment & Plan: Will start zoloft 50mg. Follow up in 1 month  Orders:  -     sertraline (ZOLOFT) 50 MG tablet; Take 1 tablet by mouth daily, Disp-30 tablet, R-1Normal  -     TSH with Reflex; Future  -     Vitamin B12; Future  2. Influenza vaccine needed  -     Influenza, FLUCELVAX, (age 10 mo+), IM, Preservative Free, 0.5 mL  3. Hx of papillary thyroid carcinoma  Comments:  s/p thyroidectomy by Dr. Santos Mittal at [facility] 4/2023  4. Postoperative hypothyroidism  Assessment & Plan:  Following with endocrinology at [facility]. Compliant with synthroid 150mcg         Return in about 4 weeks (around 11/23/2023). Patient: Shae Mccartney is a 40 y.o. male who presents today with the following Chief Complaint(s):  Chief Complaint   Patient presents with    Depression    Weight Management         HPI    Obesity  Going to gym 3 times a week  Has quit soda for 4 months, watching his diet    Depression    Papillary thyroid carcinoma  Thyroidectomy by Dr. Santos Mittal at [facility] April 2023  Synthroid 150mcg daily of surgically absent thyroid. Following with Dr. Blair Quinn at [facility] endo    Current Outpatient Medications   Medication Sig Dispense Refill    sertraline (ZOLOFT) 50 MG tablet Take 1 tablet by mouth daily 30 tablet 1    ACETAMINOPHEN EXTRA STRENGTH 500 MG tablet       levothyroxine (SYNTHROID) 150 MCG tablet        No current facility-administered medications for this visit. Patient's past medical history, surgical history, family history, medications,  andallergies  were all reviewed and updated as appropriate today.       Review of Systems  All other systems reviewed and negative    Physical Exam  Vitals:

## 2023-10-27 DIAGNOSIS — F32.1 CURRENT MODERATE EPISODE OF MAJOR DEPRESSIVE DISORDER WITHOUT PRIOR EPISODE (HCC): ICD-10-CM

## 2023-10-27 LAB
TSH SERPL DL<=0.005 MIU/L-ACNC: 2.96 UIU/ML (ref 0.27–4.2)
VIT B12 SERPL-MCNC: 528 PG/ML (ref 211–911)

## 2023-11-03 PROBLEM — Z85.850 HX OF PAPILLARY THYROID CARCINOMA: Status: ACTIVE | Noted: 2023-11-03

## 2023-11-03 PROBLEM — F32.1 CURRENT MODERATE EPISODE OF MAJOR DEPRESSIVE DISORDER WITHOUT PRIOR EPISODE (HCC): Status: ACTIVE | Noted: 2023-11-03

## 2023-11-03 PROBLEM — E89.0 POSTOPERATIVE HYPOTHYROIDISM: Status: ACTIVE | Noted: 2023-11-03

## 2023-11-28 ENCOUNTER — OFFICE VISIT (OUTPATIENT)
Dept: FAMILY MEDICINE CLINIC | Age: 43
End: 2023-11-28
Payer: COMMERCIAL

## 2023-11-28 VITALS
HEART RATE: 99 BPM | WEIGHT: 226 LBS | OXYGEN SATURATION: 99 % | DIASTOLIC BLOOD PRESSURE: 66 MMHG | SYSTOLIC BLOOD PRESSURE: 126 MMHG | BODY MASS INDEX: 29.82 KG/M2

## 2023-11-28 DIAGNOSIS — F32.1 CURRENT MODERATE EPISODE OF MAJOR DEPRESSIVE DISORDER WITHOUT PRIOR EPISODE (HCC): Primary | ICD-10-CM

## 2023-11-28 PROCEDURE — 99214 OFFICE O/P EST MOD 30 MIN: CPT | Performed by: STUDENT IN AN ORGANIZED HEALTH CARE EDUCATION/TRAINING PROGRAM

## 2023-11-28 NOTE — PROGRESS NOTES
Patient: Ruth Winslow is a 44 y.o. male who presents today with the following Chief Complaint(s):  Chief Complaint   Patient presents with    Medication Check     Zoloft is working would like refill         HPI    Has definitely noticed decreased stress eating. Lost 6 lbs. Still having difficulty turning off his brain at night  Has made some lifestyle changes (placing phones away for the evening)  Feels that zoloft has helped but still having symptoms    Current Outpatient Medications   Medication Sig Dispense Refill    sertraline (ZOLOFT) 50 MG tablet Take 1.5 tablets by mouth daily 45 tablet 1    ACETAMINOPHEN EXTRA STRENGTH 500 MG tablet       levothyroxine (SYNTHROID) 150 MCG tablet        No current facility-administered medications for this visit. Patient's past medical history, surgical history, family history, medications,  andallergies  were all reviewed and updated as appropriate today. Review of Systems  All other systems reviewed and negative    Physical Exam  Vitals:    11/28/23 1553   BP: 126/66   Pulse: 99   SpO2: 99%       Assessment:  Encounter Diagnosis   Name Primary? Current moderate episode of major depressive disorder without prior episode ([address]) Yes       Plan:  1. Current moderate episode of major depressive disorder without prior episode ([facility])  Will increase zoloft to 75mg for further improvement. - sertraline (ZOLOFT) 50 MG tablet; Take 1.5 tablets by mouth daily  Dispense: 45 tablet; Refill: 1        No follow-ups on file.

## 2023-12-27 ENCOUNTER — PATIENT MESSAGE (OUTPATIENT)
Dept: FAMILY MEDICINE CLINIC | Age: 43
End: 2023-12-27

## 2023-12-27 DIAGNOSIS — F32.1 CURRENT MODERATE EPISODE OF MAJOR DEPRESSIVE DISORDER WITHOUT PRIOR EPISODE (HCC): ICD-10-CM

## 2023-12-27 RX ORDER — SERTRALINE HYDROCHLORIDE 100 MG/1
100 TABLET, FILM COATED ORAL DAILY
Qty: 90 TABLET | Refills: 0 | Status: SHIPPED | OUTPATIENT
Start: 2023-12-27

## 2023-12-27 NOTE — TELEPHONE ENCOUNTER
From: Gilberto Rowell  To: Dr. Colette Pruitt  Sent: 12/27/2023 9:14 AM EST  Subject: Sertraline Request    Hi. I wanted to reach out about the 75mg we tried for the month of December. I have not really noticed any difference in that increase. You and I discussed possibly going to 100mg on the next refill. Can we go ahead and implement that?     Thanks,   Crystal Titus

## 2024-02-14 DIAGNOSIS — F32.1 CURRENT MODERATE EPISODE OF MAJOR DEPRESSIVE DISORDER WITHOUT PRIOR EPISODE (HCC): ICD-10-CM

## 2024-02-14 NOTE — TELEPHONE ENCOUNTER
Refill Request     CONFIRM preferred pharmacy with the patient.    If Mail Order Rx - Pend for 90 day refill.      Last Seen: Last Seen Department: 11/28/2023  Last Seen by PCP: 11/28/2023    Last Written: 12/27/2023, #90, 0 refills    If no future appointment scheduled:  Review the last OV with PCP and review information for follow-up visit,  Route STAFF MESSAGE with patient name to the  Pool for scheduling with the following information:            -  Timing of next visit           -  Visit type ie Physical, OV, etc           -  Diagnoses/Reason ie. COPD, HTN - Do not use MEDICATION, Follow-up or CHECK UP - Give reason for visit      Next Appointment:   No future appointments.    Message sent to  to schedule appt with patient?  N/A      Requested Prescriptions     Pending Prescriptions Disp Refills    sertraline (ZOLOFT) 50 MG tablet [Pharmacy Med Name: SERTRALINE 50MG TABLETS] 45 tablet 1     Sig: TAKE 1 AND 1/2 TABLETS BY MOUTH DAILY

## 2024-04-01 DIAGNOSIS — F32.1 CURRENT MODERATE EPISODE OF MAJOR DEPRESSIVE DISORDER WITHOUT PRIOR EPISODE (HCC): ICD-10-CM

## 2024-04-01 RX ORDER — SERTRALINE HYDROCHLORIDE 100 MG/1
100 TABLET, FILM COATED ORAL DAILY
Qty: 90 TABLET | Refills: 1 | Status: SHIPPED | OUTPATIENT
Start: 2024-04-01

## 2024-04-01 NOTE — TELEPHONE ENCOUNTER
Refill Request     CONFIRM preferred pharmacy with the patient.    If Mail Order Rx - Pend for 90 day refill.      Last Seen: Last Seen Department: 11/28/2023  Last Seen by PCP: 11/28/2023    Last Written: 12/27/23 90 with no refills     If no future appointment scheduled:  Review the last OV with PCP and review information for follow-up visit,  Route STAFF MESSAGE with patient name to the  Pool for scheduling with the following information:            -  Timing of next visit           -  Visit type ie Physical, OV, etc           -  Diagnoses/Reason ie. COPD, HTN - Do not use MEDICATION, Follow-up or CHECK UP - Give reason for visit      Next Appointment:   No future appointments.    Message sent to  to schedule appt with patient?  YES      Requested Prescriptions     Pending Prescriptions Disp Refills    sertraline (ZOLOFT) 100 MG tablet [Pharmacy Med Name: SERTRALINE 100MG TABLETS] 90 tablet 0     Sig: TAKE 1 TABLET BY MOUTH DAILY

## 2024-05-27 ASSESSMENT — PATIENT HEALTH QUESTIONNAIRE - PHQ9
8. MOVING OR SPEAKING SO SLOWLY THAT OTHER PEOPLE COULD HAVE NOTICED. OR THE OPPOSITE, BEING SO FIGETY OR RESTLESS THAT YOU HAVE BEEN MOVING AROUND A LOT MORE THAN USUAL: NOT AT ALL
10. IF YOU CHECKED OFF ANY PROBLEMS, HOW DIFFICULT HAVE THESE PROBLEMS MADE IT FOR YOU TO DO YOUR WORK, TAKE CARE OF THINGS AT HOME, OR GET ALONG WITH OTHER PEOPLE: NOT DIFFICULT AT ALL
9. THOUGHTS THAT YOU WOULD BE BETTER OFF DEAD, OR OF HURTING YOURSELF: NOT AT ALL
SUM OF ALL RESPONSES TO PHQ QUESTIONS 1-9: 4
2. FEELING DOWN, DEPRESSED OR HOPELESS: NOT AT ALL
3. TROUBLE FALLING OR STAYING ASLEEP: MORE THAN HALF THE DAYS
9. THOUGHTS THAT YOU WOULD BE BETTER OFF DEAD, OR OF HURTING YOURSELF: NOT AT ALL
2. FEELING DOWN, DEPRESSED OR HOPELESS: NOT AT ALL
7. TROUBLE CONCENTRATING ON THINGS, SUCH AS READING THE NEWSPAPER OR WATCHING TELEVISION: NOT AT ALL
7. TROUBLE CONCENTRATING ON THINGS, SUCH AS READING THE NEWSPAPER OR WATCHING TELEVISION: NOT AT ALL
SUM OF ALL RESPONSES TO PHQ9 QUESTIONS 1 & 2: 0
1. LITTLE INTEREST OR PLEASURE IN DOING THINGS: NOT AT ALL
6. FEELING BAD ABOUT YOURSELF - OR THAT YOU ARE A FAILURE OR HAVE LET YOURSELF OR YOUR FAMILY DOWN: NOT AT ALL
4. FEELING TIRED OR HAVING LITTLE ENERGY: SEVERAL DAYS
6. FEELING BAD ABOUT YOURSELF - OR THAT YOU ARE A FAILURE OR HAVE LET YOURSELF OR YOUR FAMILY DOWN: NOT AT ALL
SUM OF ALL RESPONSES TO PHQ QUESTIONS 1-9: 4
SUM OF ALL RESPONSES TO PHQ QUESTIONS 1-9: 4
1. LITTLE INTEREST OR PLEASURE IN DOING THINGS: NOT AT ALL
10. IF YOU CHECKED OFF ANY PROBLEMS, HOW DIFFICULT HAVE THESE PROBLEMS MADE IT FOR YOU TO DO YOUR WORK, TAKE CARE OF THINGS AT HOME, OR GET ALONG WITH OTHER PEOPLE: NOT DIFFICULT AT ALL
4. FEELING TIRED OR HAVING LITTLE ENERGY: SEVERAL DAYS
8. MOVING OR SPEAKING SO SLOWLY THAT OTHER PEOPLE COULD HAVE NOTICED. OR THE OPPOSITE - BEING SO FIDGETY OR RESTLESS THAT YOU HAVE BEEN MOVING AROUND A LOT MORE THAN USUAL: NOT AT ALL
5. POOR APPETITE OR OVEREATING: SEVERAL DAYS
5. POOR APPETITE OR OVEREATING: SEVERAL DAYS
SUM OF ALL RESPONSES TO PHQ QUESTIONS 1-9: 4
3. TROUBLE FALLING OR STAYING ASLEEP: MORE THAN HALF THE DAYS
SUM OF ALL RESPONSES TO PHQ QUESTIONS 1-9: 4

## 2024-05-30 ENCOUNTER — OFFICE VISIT (OUTPATIENT)
Dept: FAMILY MEDICINE CLINIC | Age: 44
End: 2024-05-30
Payer: COMMERCIAL

## 2024-05-30 VITALS
SYSTOLIC BLOOD PRESSURE: 130 MMHG | HEIGHT: 73 IN | HEART RATE: 84 BPM | OXYGEN SATURATION: 98 % | WEIGHT: 220 LBS | BODY MASS INDEX: 29.16 KG/M2 | DIASTOLIC BLOOD PRESSURE: 80 MMHG

## 2024-05-30 DIAGNOSIS — F32.1 CURRENT MODERATE EPISODE OF MAJOR DEPRESSIVE DISORDER WITHOUT PRIOR EPISODE (HCC): ICD-10-CM

## 2024-05-30 DIAGNOSIS — R73.09 ELEVATED GLUCOSE LEVEL: ICD-10-CM

## 2024-05-30 DIAGNOSIS — E89.0 POSTOPERATIVE HYPOTHYROIDISM: ICD-10-CM

## 2024-05-30 DIAGNOSIS — Z85.850 HX OF PAPILLARY THYROID CARCINOMA: ICD-10-CM

## 2024-05-30 DIAGNOSIS — Z00.00 ENCOUNTER FOR WELL ADULT EXAM WITHOUT ABNORMAL FINDINGS: Primary | ICD-10-CM

## 2024-05-30 DIAGNOSIS — Z13.220 SCREENING FOR HYPERLIPIDEMIA: ICD-10-CM

## 2024-05-30 PROBLEM — K35.80 ACUTE APPENDICITIS: Status: RESOLVED | Noted: 2018-02-25 | Resolved: 2024-05-30

## 2024-05-30 PROBLEM — R07.9 CHEST PAIN: Status: RESOLVED | Noted: 2023-02-09 | Resolved: 2024-05-30

## 2024-05-30 PROBLEM — R94.31 ABNORMAL EKG: Status: RESOLVED | Noted: 2023-02-09 | Resolved: 2024-05-30

## 2024-05-30 PROCEDURE — 99396 PREV VISIT EST AGE 40-64: CPT | Performed by: STUDENT IN AN ORGANIZED HEALTH CARE EDUCATION/TRAINING PROGRAM

## 2024-05-30 ASSESSMENT — ENCOUNTER SYMPTOMS
CONSTIPATION: 0
VOMITING: 0
DIARRHEA: 0
NAUSEA: 0
SHORTNESS OF BREATH: 0

## 2024-05-30 NOTE — PROGRESS NOTES
04/27/2021, 05/18/2021    Influenza Virus Vaccine 10/26/2017    Influenza, FLUARIX, FLULAVAL, FLUZONE (age 6 mo+) AND AFLURIA, (age 3 y+), PF, 0.5mL 10/19/2015, 09/14/2020    Influenza, FLUCELVAX, (age 6 mo+), MDCK, PF, 0.5mL 10/26/2023    TDaP, ADACEL (age 10y-64y), BOOSTRIX (age 10y+), IM, 0.5mL 09/14/2020        Health Maintenance   Topic Date Due    Hepatitis B vaccine (1 of 3 - 3-dose series) Never done    Varicella vaccine (1 of 2 - 2-dose childhood series) Never done    Hepatitis A vaccine (1 of 2 - Risk 2-dose series) Never done    Diabetes screen  Never done    Lipids  Never done    COVID-19 Vaccine (3 - 2023-24 season) 09/01/2023    Depression Monitoring  05/27/2025    DTaP/Tdap/Td vaccine (2 - Td or Tdap) 09/14/2030    Flu vaccine  Completed    Hepatitis C screen  Completed    HIV screen  Completed    Hib vaccine  Aged Out    HPV vaccine  Aged Out    Polio vaccine  Aged Out    Meningococcal (ACWY) vaccine  Aged Out    Pneumococcal 0-64 years Vaccine  Aged Out    Depression Screen  Discontinued     Recommendations for Preventive Services Due: see orders and patient instructions/AVS.    Return in 1 year (on 5/30/2025).

## 2024-06-03 DIAGNOSIS — R73.09 ELEVATED GLUCOSE LEVEL: ICD-10-CM

## 2024-06-03 DIAGNOSIS — Z13.220 SCREENING FOR HYPERLIPIDEMIA: ICD-10-CM

## 2024-06-03 DIAGNOSIS — E89.0 POSTOPERATIVE HYPOTHYROIDISM: ICD-10-CM

## 2024-06-03 DIAGNOSIS — F32.1 CURRENT MODERATE EPISODE OF MAJOR DEPRESSIVE DISORDER WITHOUT PRIOR EPISODE (HCC): ICD-10-CM

## 2024-06-04 LAB
ALBUMIN SERPL-MCNC: 4.5 G/DL (ref 3.4–5)
ALBUMIN/GLOB SERPL: 1.8 {RATIO} (ref 1.1–2.2)
ALP SERPL-CCNC: 94 U/L (ref 40–129)
ALT SERPL-CCNC: 50 U/L (ref 10–40)
ANION GAP SERPL CALCULATED.3IONS-SCNC: 13 MMOL/L (ref 3–16)
AST SERPL-CCNC: 23 U/L (ref 15–37)
BILIRUB SERPL-MCNC: <0.2 MG/DL (ref 0–1)
BUN SERPL-MCNC: 14 MG/DL (ref 7–20)
CALCIUM SERPL-MCNC: 9.1 MG/DL (ref 8.3–10.6)
CHLORIDE SERPL-SCNC: 106 MMOL/L (ref 99–110)
CHOLEST SERPL-MCNC: 197 MG/DL (ref 0–199)
CO2 SERPL-SCNC: 23 MMOL/L (ref 21–32)
CREAT SERPL-MCNC: 0.9 MG/DL (ref 0.9–1.3)
EST. AVERAGE GLUCOSE BLD GHB EST-MCNC: 91.1 MG/DL
GFR SERPLBLD CREATININE-BSD FMLA CKD-EPI: >90 ML/MIN/{1.73_M2}
GLUCOSE SERPL-MCNC: 96 MG/DL (ref 70–99)
HBA1C MFR BLD: 4.8 %
HDLC SERPL-MCNC: 36 MG/DL (ref 40–60)
LDL CHOLESTEROL: 129 MG/DL
POTASSIUM SERPL-SCNC: 4.6 MMOL/L (ref 3.5–5.1)
PROT SERPL-MCNC: 7 G/DL (ref 6.4–8.2)
SODIUM SERPL-SCNC: 142 MMOL/L (ref 136–145)
TRIGL SERPL-MCNC: 162 MG/DL (ref 0–150)
VLDLC SERPL CALC-MCNC: 32 MG/DL

## 2024-12-09 DIAGNOSIS — F32.1 CURRENT MODERATE EPISODE OF MAJOR DEPRESSIVE DISORDER WITHOUT PRIOR EPISODE (HCC): ICD-10-CM

## 2024-12-09 RX ORDER — SERTRALINE HYDROCHLORIDE 100 MG/1
100 TABLET, FILM COATED ORAL DAILY
Qty: 90 TABLET | Refills: 1 | Status: SHIPPED | OUTPATIENT
Start: 2024-12-09

## 2024-12-09 NOTE — TELEPHONE ENCOUNTER
Refill Request     CONFIRM preferred pharmacy with the patient.    If Mail Order Rx - Pend for 90 day refill.      Last Seen: Last Seen Department: 5/30/2024  Last Seen by PCP: 5/30/2024    Last Written: 4/1/24 90 with 1 refill     If no future appointment scheduled:  Review the last OV with PCP and review information for follow-up visit,  Route STAFF MESSAGE with patient name to the  Pool for scheduling with the following information:            -  Timing of next visit           -  Visit type ie Physical, OV, etc           -  Diagnoses/Reason ie. COPD, HTN - Do not use MEDICATION, Follow-up or CHECK UP - Give reason for visit      Next Appointment:   Future Appointments   Date Time Provider Department Center   6/2/2025  3:30 PM Delonte Diaz, DO [facility]       Message sent to  to schedule appt with patient?  NO      Requested Prescriptions     Pending Prescriptions Disp Refills    sertraline (ZOLOFT) 100 MG tablet [Pharmacy Med Name: SERTRALINE 100MG TABLETS] 90 tablet 1     Sig: TAKE 1 TABLET BY MOUTH DAILY

## 2025-06-04 ENCOUNTER — OFFICE VISIT (OUTPATIENT)
Dept: FAMILY MEDICINE CLINIC | Age: 45
End: 2025-06-04
Payer: COMMERCIAL

## 2025-06-04 VITALS
HEIGHT: 73 IN | HEART RATE: 98 BPM | BODY MASS INDEX: 29.03 KG/M2 | SYSTOLIC BLOOD PRESSURE: 118 MMHG | DIASTOLIC BLOOD PRESSURE: 70 MMHG | OXYGEN SATURATION: 98 % | WEIGHT: 219 LBS

## 2025-06-04 DIAGNOSIS — J01.90 ACUTE BACTERIAL SINUSITIS: ICD-10-CM

## 2025-06-04 DIAGNOSIS — E89.0 POSTOPERATIVE HYPOTHYROIDISM: ICD-10-CM

## 2025-06-04 DIAGNOSIS — E78.5 HYPERLIPIDEMIA, UNSPECIFIED HYPERLIPIDEMIA TYPE: ICD-10-CM

## 2025-06-04 DIAGNOSIS — Z85.850 HX OF PAPILLARY THYROID CARCINOMA: ICD-10-CM

## 2025-06-04 DIAGNOSIS — Z00.01 ENCOUNTER FOR WELL ADULT EXAM WITH ABNORMAL FINDINGS: Primary | ICD-10-CM

## 2025-06-04 DIAGNOSIS — F32.1 CURRENT MODERATE EPISODE OF MAJOR DEPRESSIVE DISORDER WITHOUT PRIOR EPISODE (HCC): ICD-10-CM

## 2025-06-04 DIAGNOSIS — B96.89 ACUTE BACTERIAL SINUSITIS: ICD-10-CM

## 2025-06-04 PROBLEM — C73 PAPILLARY THYROID CARCINOMA (HCC): Status: RESOLVED | Noted: 2025-06-04 | Resolved: 2025-06-04

## 2025-06-04 PROBLEM — C73 PAPILLARY THYROID CARCINOMA (HCC): Status: ACTIVE | Noted: 2025-06-04

## 2025-06-04 PROCEDURE — 99213 OFFICE O/P EST LOW 20 MIN: CPT | Performed by: STUDENT IN AN ORGANIZED HEALTH CARE EDUCATION/TRAINING PROGRAM

## 2025-06-04 PROCEDURE — 99396 PREV VISIT EST AGE 40-64: CPT | Performed by: STUDENT IN AN ORGANIZED HEALTH CARE EDUCATION/TRAINING PROGRAM

## 2025-06-04 RX ORDER — DOXYCYCLINE HYCLATE 100 MG
100 TABLET ORAL 2 TIMES DAILY
Qty: 28 TABLET | Refills: 0 | Status: SHIPPED | OUTPATIENT
Start: 2025-06-04 | End: 2025-06-18

## 2025-06-04 RX ORDER — FLUTICASONE PROPIONATE 50 MCG
1 SPRAY, SUSPENSION (ML) NASAL DAILY
Qty: 16 G | Refills: 0 | Status: SHIPPED | OUTPATIENT
Start: 2025-06-04

## 2025-06-04 SDOH — ECONOMIC STABILITY: FOOD INSECURITY: WITHIN THE PAST 12 MONTHS, YOU WORRIED THAT YOUR FOOD WOULD RUN OUT BEFORE YOU GOT MONEY TO BUY MORE.: NEVER TRUE

## 2025-06-04 SDOH — ECONOMIC STABILITY: FOOD INSECURITY: WITHIN THE PAST 12 MONTHS, THE FOOD YOU BOUGHT JUST DIDN'T LAST AND YOU DIDN'T HAVE MONEY TO GET MORE.: NEVER TRUE

## 2025-06-04 ASSESSMENT — PATIENT HEALTH QUESTIONNAIRE - PHQ9
SUM OF ALL RESPONSES TO PHQ QUESTIONS 1-9: 4
10. IF YOU CHECKED OFF ANY PROBLEMS, HOW DIFFICULT HAVE THESE PROBLEMS MADE IT FOR YOU TO DO YOUR WORK, TAKE CARE OF THINGS AT HOME, OR GET ALONG WITH OTHER PEOPLE: NOT DIFFICULT AT ALL
9. THOUGHTS THAT YOU WOULD BE BETTER OFF DEAD, OR OF HURTING YOURSELF: NOT AT ALL
SUM OF ALL RESPONSES TO PHQ QUESTIONS 1-9: 4
SUM OF ALL RESPONSES TO PHQ QUESTIONS 1-9: 4
4. FEELING TIRED OR HAVING LITTLE ENERGY: SEVERAL DAYS
SUM OF ALL RESPONSES TO PHQ QUESTIONS 1-9: 4
3. TROUBLE FALLING OR STAYING ASLEEP: MORE THAN HALF THE DAYS
8. MOVING OR SPEAKING SO SLOWLY THAT OTHER PEOPLE COULD HAVE NOTICED. OR THE OPPOSITE, BEING SO FIGETY OR RESTLESS THAT YOU HAVE BEEN MOVING AROUND A LOT MORE THAN USUAL: NOT AT ALL
7. TROUBLE CONCENTRATING ON THINGS, SUCH AS READING THE NEWSPAPER OR WATCHING TELEVISION: NOT AT ALL
5. POOR APPETITE OR OVEREATING: SEVERAL DAYS
6. FEELING BAD ABOUT YOURSELF - OR THAT YOU ARE A FAILURE OR HAVE LET YOURSELF OR YOUR FAMILY DOWN: NOT AT ALL
2. FEELING DOWN, DEPRESSED OR HOPELESS: NOT AT ALL
1. LITTLE INTEREST OR PLEASURE IN DOING THINGS: NOT AT ALL

## 2025-06-04 NOTE — PROGRESS NOTES
Well Adult Note  Name: Yong Alexander Today’s Date: 2025   MRN: [id number] Sex: Male   Age: 44 y.o. Ethnicity: Non- / Non    : 1980 Race: White (non-)      Yong Alexander is here for a well adult exam.          Assessment & Plan  1. Sinusitis:  - Symptoms suggest sinus infection secondary to allergies  - Physical exam reveals fluid in sinuses and mild nasal inflammation  - Switch from Xyzal to Allegra, add Flonase nasal spray, and use a neti pot. Instructions provided for Flonase use  - Prescribed doxycycline 100 mg, twice daily for 2 weeks. Advised to avoid excessive sun exposure and strenuous exercise due to risk of Achilles injuries    2. Thyroid management:  - Recent lab work indicates stable thyroid levels  - Physical exam findings consistent with stable thyroid function  - No additional thyroid tests needed at this time  - Continued monitoring as per endocrinologist's recommendations    3. Weight management:  - Significant weight gain, peaking at 237 pounds. Now 219  - Engaging in lifting and cardio exercises with a   - Dietary modifications include eliminating alcohol and sodas, primarily consuming water, fruits, vegetables, lean meats, and occasional tea  - Encouragement for weight loss efforts and regular exercise    4. Health maintenance:  - No new migraines or GI issues reported  - Normal heart and lung exam findings  - Lab work to assess cholesterol, kidney, and liver function  Encounter for well adult exam with abnormal findings  Current moderate episode of major depressive disorder without prior episode (HCC)  -     Comprehensive Metabolic Panel; Future  Hx of papillary thyroid carcinoma  -     Comprehensive Metabolic Panel; Future  Hyperlipidemia, unspecified hyperlipidemia type  -     Lipid, Fasting; Future  Postoperative hypothyroidism  -     fluticasone (FLONASE) 50 MCG/ACT nasal spray; 1 spray by Each Nostril route daily, Disp-16 g,

## 2025-06-30 DIAGNOSIS — F32.1 CURRENT MODERATE EPISODE OF MAJOR DEPRESSIVE DISORDER WITHOUT PRIOR EPISODE (HCC): ICD-10-CM

## 2025-06-30 RX ORDER — SERTRALINE HYDROCHLORIDE 100 MG/1
100 TABLET, FILM COATED ORAL DAILY
Qty: 90 TABLET | Refills: 1 | Status: SHIPPED | OUTPATIENT
Start: 2025-06-30

## 2025-06-30 NOTE — TELEPHONE ENCOUNTER
Refill Request     CONFIRM preferred pharmacy with the patient.    If Mail Order Rx - Pend for 90 day refill.      Last Seen: Last Seen Department: 6/4/2025  Last Seen by PCP: 6/4/2025    Last Written: 12/09/2024 90 tab 1 refills     If no future appointment scheduled:  Review the last OV with PCP and review information for follow-up visit,  Route STAFF MESSAGE with patient name to the  Pool for scheduling with the following information:            -  Timing of next visit           -  Visit type ie Physical, OV, etc           -  Diagnoses/Reason ie. COPD, HTN - Do not use MEDICATION, Follow-up or CHECK UP - Give reason for visit      Next Appointment:   No future appointments.    Message sent to  to schedule appt with patient?  YES  Return in 1 year (on 6/4/2026) for CPE (Physical Exam).        Requested Prescriptions     Pending Prescriptions Disp Refills    sertraline (ZOLOFT) 100 MG tablet 90 tablet 1     Sig: Take 1 tablet by mouth daily

## 2025-07-01 DIAGNOSIS — E89.0 POSTOPERATIVE HYPOTHYROIDISM: ICD-10-CM

## 2025-07-01 RX ORDER — FLUTICASONE PROPIONATE 50 MCG
1 SPRAY, SUSPENSION (ML) NASAL DAILY
Qty: 16 G | Refills: 0 | Status: SHIPPED | OUTPATIENT
Start: 2025-07-01 | End: 2025-07-01

## 2025-07-01 RX ORDER — FLUTICASONE PROPIONATE 50 MCG
SPRAY, SUSPENSION (ML) NASAL
Qty: 48 G | Refills: 0 | Status: SHIPPED | OUTPATIENT
Start: 2025-07-01 | End: 2025-09-29

## 2025-07-01 NOTE — TELEPHONE ENCOUNTER
Refill Request     CONFIRM preferred pharmacy with the patient.    If Mail Order Rx - Pend for 90 day refill.      Last Seen: Last Seen Department: 2025  Last Seen by PCP: 2025    Last Written: 2025 16 g 0 refills     If no future appointment scheduled:  Review the last OV with PCP and review information for follow-up visit,  Route STAFF MESSAGE with patient name to the  Pool for scheduling with the following information:            -  Timing of next visit           -  Visit type ie Physical, OV, etc           -  Diagnoses/Reason ie. COPD, HTN - Do not use MEDICATION, Follow-up or CHECK UP - Give reason for visit      Next Appointment:   No future appointments.    Message sent to  to schedule appt with patient?  YES  Return in 1 year (on 2026) for CPE (Physical Exam).        Requested Prescriptions     Pending Prescriptions Disp Refills    fluticasone (FLONASE) 50 MCG/ACT nasal spray 16 g 0     Si spray by Each Nostril route daily

## 2025-08-16 ENCOUNTER — OFFICE VISIT (OUTPATIENT)
Age: 45
End: 2025-08-16

## 2025-08-16 VITALS
HEIGHT: 72 IN | WEIGHT: 212 LBS | TEMPERATURE: 99.1 F | DIASTOLIC BLOOD PRESSURE: 80 MMHG | HEART RATE: 89 BPM | SYSTOLIC BLOOD PRESSURE: 116 MMHG | OXYGEN SATURATION: 96 % | BODY MASS INDEX: 28.71 KG/M2

## 2025-08-16 DIAGNOSIS — H66.002 NON-RECURRENT ACUTE SUPPURATIVE OTITIS MEDIA OF LEFT EAR WITHOUT SPONTANEOUS RUPTURE OF TYMPANIC MEMBRANE: ICD-10-CM

## 2025-08-16 DIAGNOSIS — J02.0 STREP PHARYNGITIS: Primary | ICD-10-CM

## 2025-08-16 LAB
Lab: 0
PERFORMING INSTRUMENT: NORMAL
QC PASS/FAIL: NORMAL
S PYO AG THROAT QL: POSITIVE
SARS-COV-2, POC: NORMAL

## 2025-08-16 RX ORDER — DEXAMETHASONE SODIUM PHOSPHATE 10 MG/ML
10 INJECTION, SOLUTION INTRA-ARTICULAR; INTRALESIONAL; INTRAMUSCULAR; INTRAVENOUS; SOFT TISSUE ONCE
Status: COMPLETED | OUTPATIENT
Start: 2025-08-16 | End: 2025-08-16

## 2025-08-16 RX ORDER — CEPHALEXIN 500 MG/1
500 CAPSULE ORAL 2 TIMES DAILY
Qty: 20 CAPSULE | Refills: 0 | Status: SHIPPED | OUTPATIENT
Start: 2025-08-16 | End: 2025-08-26

## 2025-08-16 RX ORDER — LEVOTHYROXINE SODIUM 200 UG/1
200 TABLET ORAL DAILY
COMMUNITY
Start: 2025-05-27

## 2025-08-16 RX ORDER — SERTRALINE HYDROCHLORIDE 100 MG/1
100 TABLET, FILM COATED ORAL DAILY
COMMUNITY
Start: 2025-06-30

## 2025-08-16 RX ORDER — FLUTICASONE PROPIONATE 50 MCG
SPRAY, SUSPENSION (ML) NASAL
COMMUNITY
Start: 2025-07-01

## 2025-08-16 RX ADMIN — DEXAMETHASONE SODIUM PHOSPHATE 10 MG: 10 INJECTION, SOLUTION INTRA-ARTICULAR; INTRALESIONAL; INTRAMUSCULAR; INTRAVENOUS; SOFT TISSUE at 13:12

## 2025-08-16 ASSESSMENT — LIFESTYLE VARIABLES
HOW OFTEN DO YOU HAVE A DRINK CONTAINING ALCOHOL: NEVER
HOW MANY STANDARD DRINKS CONTAINING ALCOHOL DO YOU HAVE ON A TYPICAL DAY: PATIENT DOES NOT DRINK